# Patient Record
Sex: MALE | Race: OTHER | HISPANIC OR LATINO | URBAN - METROPOLITAN AREA
[De-identification: names, ages, dates, MRNs, and addresses within clinical notes are randomized per-mention and may not be internally consistent; named-entity substitution may affect disease eponyms.]

---

## 2018-02-08 ENCOUNTER — INPATIENT (INPATIENT)
Facility: HOSPITAL | Age: 48
LOS: 6 days | Discharge: HOME HEALTH | DRG: 236 | End: 2018-02-15
Attending: THORACIC SURGERY (CARDIOTHORACIC VASCULAR SURGERY) | Admitting: THORACIC SURGERY (CARDIOTHORACIC VASCULAR SURGERY)
Payer: COMMERCIAL

## 2018-02-08 VITALS
TEMPERATURE: 98 F | HEART RATE: 74 BPM | OXYGEN SATURATION: 95 % | HEIGHT: 68 IN | WEIGHT: 193.79 LBS | RESPIRATION RATE: 20 BRPM | DIASTOLIC BLOOD PRESSURE: 77 MMHG | SYSTOLIC BLOOD PRESSURE: 121 MMHG

## 2018-02-08 DIAGNOSIS — I10 ESSENTIAL (PRIMARY) HYPERTENSION: ICD-10-CM

## 2018-02-08 DIAGNOSIS — Z98.890 OTHER SPECIFIED POSTPROCEDURAL STATES: Chronic | ICD-10-CM

## 2018-02-08 DIAGNOSIS — Z29.9 ENCOUNTER FOR PROPHYLACTIC MEASURES, UNSPECIFIED: ICD-10-CM

## 2018-02-08 DIAGNOSIS — E78.5 HYPERLIPIDEMIA, UNSPECIFIED: ICD-10-CM

## 2018-02-08 DIAGNOSIS — S86.012A STRAIN OF LEFT ACHILLES TENDON, INITIAL ENCOUNTER: Chronic | ICD-10-CM

## 2018-02-08 DIAGNOSIS — I46.9 CARDIAC ARREST, CAUSE UNSPECIFIED: ICD-10-CM

## 2018-02-08 DIAGNOSIS — I25.10 ATHEROSCLEROTIC HEART DISEASE OF NATIVE CORONARY ARTERY WITHOUT ANGINA PECTORIS: ICD-10-CM

## 2018-02-08 DIAGNOSIS — R63.8 OTHER SYMPTOMS AND SIGNS CONCERNING FOOD AND FLUID INTAKE: ICD-10-CM

## 2018-02-08 PROBLEM — Z00.00 ENCOUNTER FOR PREVENTIVE HEALTH EXAMINATION: Status: ACTIVE | Noted: 2018-02-08

## 2018-02-08 LAB
ALBUMIN SERPL ELPH-MCNC: 3.7 G/DL — SIGNIFICANT CHANGE UP (ref 3.3–5)
ALP SERPL-CCNC: 77 U/L — SIGNIFICANT CHANGE UP (ref 40–120)
ALT FLD-CCNC: 86 U/L — HIGH (ref 10–45)
ANION GAP SERPL CALC-SCNC: 11 MMOL/L — SIGNIFICANT CHANGE UP (ref 5–17)
APPEARANCE UR: CLEAR — SIGNIFICANT CHANGE UP
APTT BLD: 30.6 SEC — SIGNIFICANT CHANGE UP (ref 27.5–37.4)
AST SERPL-CCNC: 42 U/L — HIGH (ref 10–40)
BASOPHILS NFR BLD AUTO: 0.2 % — SIGNIFICANT CHANGE UP (ref 0–2)
BILIRUB SERPL-MCNC: 0.3 MG/DL — SIGNIFICANT CHANGE UP (ref 0.2–1.2)
BILIRUB UR-MCNC: NEGATIVE — SIGNIFICANT CHANGE UP
BLD GP AB SCN SERPL QL: NEGATIVE — SIGNIFICANT CHANGE UP
BUN SERPL-MCNC: 15 MG/DL — SIGNIFICANT CHANGE UP (ref 7–23)
CALCIUM SERPL-MCNC: 8.8 MG/DL — SIGNIFICANT CHANGE UP (ref 8.4–10.5)
CHLORIDE SERPL-SCNC: 107 MMOL/L — SIGNIFICANT CHANGE UP (ref 96–108)
CHOLEST SERPL-MCNC: 140 MG/DL — SIGNIFICANT CHANGE UP (ref 10–199)
CK MB CFR SERPL CALC: 3.6 NG/ML — SIGNIFICANT CHANGE UP (ref 0–6.7)
CK SERPL-CCNC: 341 U/L — HIGH (ref 30–200)
CO2 SERPL-SCNC: 23 MMOL/L — SIGNIFICANT CHANGE UP (ref 22–31)
COLOR SPEC: YELLOW — SIGNIFICANT CHANGE UP
CREAT SERPL-MCNC: 0.92 MG/DL — SIGNIFICANT CHANGE UP (ref 0.5–1.3)
DIFF PNL FLD: (no result)
EOSINOPHIL NFR BLD AUTO: 1.1 % — SIGNIFICANT CHANGE UP (ref 0–6)
GLUCOSE SERPL-MCNC: 130 MG/DL — HIGH (ref 70–99)
GLUCOSE UR QL: NEGATIVE — SIGNIFICANT CHANGE UP
HBA1C BLD-MCNC: 5.9 % — HIGH (ref 4–5.6)
HCT VFR BLD CALC: 40.4 % — SIGNIFICANT CHANGE UP (ref 39–50)
HDLC SERPL-MCNC: 44 MG/DL — SIGNIFICANT CHANGE UP (ref 40–125)
HGB BLD-MCNC: 13.1 G/DL — SIGNIFICANT CHANGE UP (ref 13–17)
INR BLD: 1.07 — SIGNIFICANT CHANGE UP (ref 0.88–1.16)
KETONES UR-MCNC: NEGATIVE — SIGNIFICANT CHANGE UP
LEUKOCYTE ESTERASE UR-ACNC: NEGATIVE — SIGNIFICANT CHANGE UP
LIPID PNL WITH DIRECT LDL SERPL: 75 MG/DL — SIGNIFICANT CHANGE UP
LYMPHOCYTES # BLD AUTO: 26.5 % — SIGNIFICANT CHANGE UP (ref 13–44)
MAGNESIUM SERPL-MCNC: 2 MG/DL — SIGNIFICANT CHANGE UP (ref 1.6–2.6)
MCHC RBC-ENTMCNC: 28.6 PG — SIGNIFICANT CHANGE UP (ref 27–34)
MCHC RBC-ENTMCNC: 32.4 G/DL — SIGNIFICANT CHANGE UP (ref 32–36)
MCV RBC AUTO: 88.2 FL — SIGNIFICANT CHANGE UP (ref 80–100)
MONOCYTES NFR BLD AUTO: 9.7 % — SIGNIFICANT CHANGE UP (ref 2–14)
NEUTROPHILS NFR BLD AUTO: 62.5 % — SIGNIFICANT CHANGE UP (ref 43–77)
NITRITE UR-MCNC: NEGATIVE — SIGNIFICANT CHANGE UP
PCP SPEC-MCNC: SIGNIFICANT CHANGE UP
PH UR: 6 — SIGNIFICANT CHANGE UP (ref 5–8)
PLATELET # BLD AUTO: 245 K/UL — SIGNIFICANT CHANGE UP (ref 150–400)
POTASSIUM SERPL-MCNC: 3.8 MMOL/L — SIGNIFICANT CHANGE UP (ref 3.5–5.3)
POTASSIUM SERPL-SCNC: 3.8 MMOL/L — SIGNIFICANT CHANGE UP (ref 3.5–5.3)
PROT SERPL-MCNC: 6.4 G/DL — SIGNIFICANT CHANGE UP (ref 6–8.3)
PROT UR-MCNC: NEGATIVE MG/DL — SIGNIFICANT CHANGE UP
PROTHROM AB SERPL-ACNC: 11.9 SEC — SIGNIFICANT CHANGE UP (ref 9.8–12.7)
RBC # BLD: 4.58 M/UL — SIGNIFICANT CHANGE UP (ref 4.2–5.8)
RBC # FLD: 13.5 % — SIGNIFICANT CHANGE UP (ref 10.3–16.9)
RH IG SCN BLD-IMP: POSITIVE — SIGNIFICANT CHANGE UP
RH IG SCN BLD-IMP: POSITIVE — SIGNIFICANT CHANGE UP
SODIUM SERPL-SCNC: 141 MMOL/L — SIGNIFICANT CHANGE UP (ref 135–145)
SP GR SPEC: 1.02 — SIGNIFICANT CHANGE UP (ref 1–1.03)
TOTAL CHOLESTEROL/HDL RATIO MEASUREMENT: 3.2 RATIO — LOW (ref 3.4–9.6)
TRIGL SERPL-MCNC: 105 MG/DL — SIGNIFICANT CHANGE UP (ref 10–149)
TROPONIN T SERPL-MCNC: 0.02 NG/ML — HIGH (ref 0–0.01)
TSH SERPL-MCNC: 0.52 UIU/ML — SIGNIFICANT CHANGE UP (ref 0.35–4.94)
UROBILINOGEN FLD QL: 0.2 E.U./DL — SIGNIFICANT CHANGE UP
WBC # BLD: 8.9 K/UL — SIGNIFICANT CHANGE UP (ref 3.8–10.5)
WBC # FLD AUTO: 8.9 K/UL — SIGNIFICANT CHANGE UP (ref 3.8–10.5)

## 2018-02-08 PROCEDURE — 93880 EXTRACRANIAL BILAT STUDY: CPT | Mod: 26

## 2018-02-08 PROCEDURE — 71045 X-RAY EXAM CHEST 1 VIEW: CPT | Mod: 26

## 2018-02-08 RX ORDER — AMIODARONE HYDROCHLORIDE 400 MG/1
0.5 TABLET ORAL
Qty: 900 | Refills: 0 | Status: DISCONTINUED | OUTPATIENT
Start: 2018-02-08 | End: 2018-02-09

## 2018-02-08 RX ORDER — CHLORHEXIDINE GLUCONATE 213 G/1000ML
1 SOLUTION TOPICAL ONCE
Qty: 0 | Refills: 0 | Status: COMPLETED | OUTPATIENT
Start: 2018-02-08 | End: 2018-02-08

## 2018-02-08 RX ORDER — CHLORHEXIDINE GLUCONATE 213 G/1000ML
10 SOLUTION TOPICAL ONCE
Qty: 0 | Refills: 0 | Status: DISCONTINUED | OUTPATIENT
Start: 2018-02-08 | End: 2018-02-12

## 2018-02-08 RX ORDER — CHLORHEXIDINE GLUCONATE 213 G/1000ML
1 SOLUTION TOPICAL ONCE
Qty: 0 | Refills: 0 | Status: COMPLETED | OUTPATIENT
Start: 2018-02-09 | End: 2018-02-09

## 2018-02-08 RX ORDER — POTASSIUM CHLORIDE 20 MEQ
20 PACKET (EA) ORAL ONCE
Qty: 0 | Refills: 0 | Status: COMPLETED | OUTPATIENT
Start: 2018-02-08 | End: 2018-02-08

## 2018-02-08 RX ADMIN — Medication 20 MILLIEQUIVALENT(S): at 22:24

## 2018-02-08 RX ADMIN — AMIODARONE HYDROCHLORIDE 16.67 MG/MIN: 400 TABLET ORAL at 22:23

## 2018-02-08 NOTE — H&P ADULT - NSHPSOCIALHISTORY_GEN_ALL_CORE
Never smoker. Social drinker. Works in [employer] and travels often for his job. Lives with his fiance

## 2018-02-08 NOTE — CONSULT NOTE ADULT - ASSESSMENT
47y Male PMHx HTN, HLD, CAD (refused OHS 5 years ago after cath revealed 3VCAD), BIBA to OSH after cardiopulmonary arrest requiring CPR after a soccer game. Patient was defibrillated twice, and ROSC acheived.  At OSH patient started on amio, admitted to CCU, started on ASA/Plavix/statin.  On 2/8/18 patient undwerwent LHC revealing 3VCAD. CT surgery Dr. Nichole consulted for surgical intervention, plan for CABG tomorrow.    3VCAD s/p CPA requiring CPR  -Please obtain CBC, BMP, Coags, T&Sx2, U/A, BNP, Cardiac enzymes, TSH, A1c, Echo, PFTs, CXR, EKG, CT head non con  -Potential first case for tomorrow  -patient refusing to sign consent, Dr. Nichole will see and discuss with patient. 47y Male PMHx HTN, HLD, CAD (refused OHS 5 years ago after cath revealed 3VCAD), BIBA to OSH after cardiopulmonary arrest requiring CPR after a soccer game. Patient was defibrillated twice, and ROSC acheived.  At OSH patient started on amio, admitted to CCU, started on ASA/Plavix/statin.  On 2/8/18 patient undwerwent LHC revealing 3VCAD. CT surgery Dr. Nichole consulted for surgical intervention, plan for CABG tomorrow.    3VCAD s/p CPA requiring CPR  -Please obtain CBC, BMP, Coags, T&Sx2, U/A, BNP, Cardiac enzymes, TSH, A1c, Echo, PFTs, CXR, EKG, CT head non con  -Pt will go second case tomorrow if he consents.  Please keep him NPO after midnight in anticipation of OR, will see him in AM tomorrow to further discuss.   -patient refusing to sign consent, Dr. Nichole will see and discuss with patient.

## 2018-02-08 NOTE — H&P ADULT - ASSESSMENT
46 yo M with PMHx HTN,HLD, CAD; FHx of cousin with sudden death from MI in his 40s, transferred to [facility] from [facility] after he initially presented s/p cardiopulmonary arrest while playing soccer. Left heart catheterization 2/8/18 found multiple vessel CAD. He was transferred to [facility] for CABG, admitted to CCU for monitoring overnight prior to surgery.

## 2018-02-08 NOTE — H&P ADULT - PROBLEM SELECTOR PLAN 2
left heart catheterization 2/8/18 found 80% stenosis of RCA, 70% osteal stenosis of PDA with sequential lesions of 80-85% stenosis; patent LM; 50% osteal stenosis circumflex, 75% stenosis of mid circumflex, 80% stenosis mid to distal circumflex; 95-99% stenosis dOM1 with; 85% stenosis of pLAD and 70% stenosis dLAD.  LV gram 60% with no wall motion abnormality.  -Plan for CABG

## 2018-02-08 NOTE — H&P ADULT - NSHPPHYSICALEXAM_GEN_ALL_CORE
VITAL SIGNS:  T(F): 98 (02-08-18 @ 18:03), Max: 98 (02-08-18 @ 18:00)  HR: 74 (02-08-18 @ 18:00) (74 - 74)  BP: 121/77 (02-08-18 @ 18:00) (121/77 - 121/77)  BP(mean): 92 (02-08-18 @ 18:00) (92 - 92)  RR: 20 (02-08-18 @ 18:00) (20 - 20)  SpO2: 95% (02-08-18 @ 18:00) (95% - 95%)    PHYSICAL EXAM:  Constitutional: WDWN, NAD, resting comfortably   Head: NC/AT  Eyes: PERRL, EOMI, anicteric sclera, no mucosal pallor  ENT: no nasal discharge; uvula midline, no oropharyngeal erythema or exudates; MMM  Neck: supple; no JVD or thyromegaly, no lymphadenopathy  Respiratory: CTA B/L; no W/R/R, no retractions  Cardiac: +S1/S2; RRR; no M/R/G  Gastrointestinal: abdomen soft, NT/ND; no rebound or guarding; +BSx4  Back: spine midline, no bony tenderness or step-offs; no CVAT B/L  Extremities: warm; no calf tenderness, no pedal edema, no cyanosis, no clubbing  Musculoskeletal: NROM x4; no joint swelling, tenderness or erythema  Vascular: 2+ radial; DP/PT pulses B/L  Dermatologic: no rash, no petechia   Lymphatic: no submandibular or cervical LAD  Neurologic: AAOx3; CNII-XII grossly intact; 5/5 strength throughout, sensory symmetrically intact in B/L LE   Psychiatric: pleasant and conversive; affect and characteristics of appearance, verbalizations, behaviors are appropriate

## 2018-02-08 NOTE — H&P ADULT - FAMILY HISTORY
Family history of heart attack     Father  Still living? Unknown  Family history of hypertension, Age at diagnosis: Age Unknown     Mother  Still living? Unknown  Family history of hypertension, Age at diagnosis: Age Unknown  Family history of diabetes mellitus, Age at diagnosis: Age Unknown

## 2018-02-08 NOTE — H&P ADULT - PROBLEM SELECTOR PLAN 1
Pt with cardiac arrest lasting 2-4 min. S/p CPR and defibrillation with ROSC. S/p amiodarone 450 mg 16 cc/hr (0.5 mg /min x 18hrs) started at 9:28 AM; ASA 81 mg /Plavix/Atorvastatin 80mg.  -Likely 2/2 multivessel CAD  -EKG 2/8 from [facility] with sinus rhythm with 1st degree heart block, Q wave in III, aVF.  -Episodes of multiple ventricular ectopy beats and non sustained VT during cardiac cath

## 2018-02-08 NOTE — H&P ADULT - HISTORY OF PRESENT ILLNESS
46 yo M with PMHx HTN,HLD, CAD; FHx of sudden death from MI in 40s, transfered to [facility] from [facility] after he initiially presented s/p cardiopulmonary arrest while playing soccer.  Five years ago the patient had [facility] and was found to have triple vessel disease and refused CT surgery at that time.  He was playing soccer when he lost consciousness for 2-4 minutes. CPR was started on the field, pt was defibrillated twice and ROSC was achieved. He was brought by EMS to the hospital. He was started on amiodarone, admitted to the CCU, started on ASA/Plavix/atorvastatin.   Upon arrival to [facility] on 2/8/18, vitals were stable (T97.5, HR 78, RR 20, SpO2 98%, /77). Labs were unremarkable for FSBG 137, Trop I 0.27, CKMB 3.7, BNP 18, PT/INR/PTT 12.6/1.1/29, BUN/Crt 14/1.03, mild transaminitis AST/ALT 72/113, Alk Phos 79, H/H 14.6/45. Chest X-ray with clear lungs. EKG showed sinus rhythm with 1st degree heart block, Q wave in III, aVF.  The patient underwent left heart catheterization 2/8/18 found 8% stenosis of RCA, PDA with 70% osteal stenosis with sequential lesions of 80-85% stenosis; LM is patent; 50% osteal stenosis on circumflex, 75% stenosis of mid circumflex, mid to distal circumflex 80% stenosis; dOM1 with 95-99% stenosis; 85% stenosis in the proximal LAD and 70% stenosis in the distal LAD.  This is unchanged from his cardiac catheterization 5 years ago.  LV gram shows preserved ejection fraction. The plan was discussed as multiple stents vs CABG and he declined the offer for intervention. He was transferred to [facility] for CABG, admitted to CCU for monitoring overnight prior to surgery. Upon arrival, pt was hemodynamically stable. He denied any SOB/CP/palpitations/diarrhea/consitation/N/V, fevers/chills. He has had no peripheral edema, orthopnea, PND, no exercise intolerance. 46 yo M with PMHx HTN,HLD, CAD; FHx of cousin with sudden death from MI in his 40s, transferred to [facility] from [facility] after he initially presented s/p cardiopulmonary arrest while playing soccer.  Five years ago the patient had [facility] and was found to have triple vessel disease and refused CT surgery at that time.  He was playing soccer when he lost consciousness for 2-4 minutes. CPR was started on the field, pt was defibrillated twice and ROSC was achieved. He was brought by EMS to the hospital. He was started on amiodarone, admitted to the CCU, started on ASA/Plavix/atorvastatin.   Upon arrival to [facility] on 2/8/18, vitals were stable (T97.5, HR 78, RR 20, SpO2 98%, /77). Labs were unremarkable for FSBG 137, Trop I 0.27, CKMB 3.7, BNP 18, PT/INR/PTT 12.6/1.1/29, BUN/Crt 14/1.03, mild transaminitis AST/ALT 72/113, Alk Phos 79, H/H 14.6/45. Chest X-ray with clear lungs. EKG showed sinus rhythm with 1st degree heart block, Q wave in III, aVF.  The patient underwent left heart catheterization 2/8/18 found 80% stenosis of RCA, 70% osteal stenosis of PDA with sequential lesions of 80-85% stenosis; patent LM; 50% osteal stenosis circumflex, 75% stenosis of mid circumflex, 80% stenosis mid to distal circumflex; 95-99% stenosis dOM1 with; 85% stenosis of pLAD and 70% stenosis dLAD.  LV gram 60% with no wall motion abnormality. The plan was discussed as multiple stents vs CABG and he declined the offer for intervention. He was transferred to [facility] for CABG, admitted to CCU for monitoring overnight prior to surgery. Upon arrival, pt was hemodynamically stable. He denied any SOB/CP/palpitations/diarrhea/constipation/N/V, fevers/chills. He has had no peripheral edema, orthopnea, PND, no exercise intolerance.

## 2018-02-08 NOTE — CONSULT NOTE ADULT - SUBJECTIVE AND OBJECTIVE BOX
Surgeon: Dr. Nichole    Requesting Physician: Dr. Tamara Valentin    HISTORY OF PRESENT ILLNESS:  47y Male PMHx HTN, HLD, CAD (refused OHS 5 years ago after cath revealed 3VCAD), BIBA to OSH after cardiopulmonary arrest requiring CPR after a soccer game. Patient was defibrillated twice, and ROSC acheived.  At OSH patient started on amio, admitted to CCU, started on ASA/Plavix/statin.  On 18 patient undwerwent LHC revealing 3VCAD (RCA 80%, osPDA 70% with sequential lesions of 80-85%, LCx 50%, mCx 75%, m-dCx 80% dOM1 95-99%, pLAD 85%, dLAD 70% EF 60%). Patient was then transferred to [facility] for CABG evaluation with Dr. Nichole, and was admitted to CCU.  Patient is currently refusing intervention at this time.     PAST MEDICAL & SURGICAL HISTORY:  HLD (hyperlipidemia)  HTN (hypertension)  CAD (coronary artery disease)  H/O umbilical hernia repair  Achilles rupture, left: s/p repair  History of appendectomy      MEDICATIONS  (STANDING):  amiodarone Infusion 0.5 mG/Min (16.667 mL/Hr) IV Continuous <Continuous>  chlorhexidine 0.12% Liquid 10 milliLiter(s) Swish and Spit once  chlorhexidine 4% Liquid 1 Application(s) Topical once  chlorhexidine 4% Liquid 1 Application(s) Topical once    MEDICATIONS  (PRN):      Allergies    No Known Allergies    Intolerances        SOCIAL HISTORY:  Smoker:  No  ETOH use:  No  Ilicit Drug use:  NO  Assisted device use (Cane / Walker): None  Live with: spouse    FAMILY HISTORY:  Family history of heart attack  Family history of diabetes mellitus (Mother)  Family history of hypertension (Father, Mother)      Review of Systems  CONSTITUTIONAL:  Fevers / chills, sweats, fatigue, weight loss, weight gain                                    NEGATIVE  NEURO:  parathesias, seizures, syncope, confusion                                                                               NEGATIVE  EYES:  Blurry vision, discharge, pain, loss of vision                                                                                  NEGATIVE  ENMT:  Difficulty hearing, vertigo, dysphagia, epistaxis, recent dental work                                     NEGATIVE  CV:  Chest pain, palpitations, EDWARDS, orthopnea                                                                                         NEGATIVE  RESPIRATORY:  Wheezing, SOB, cough / sputum, hemoptysis                                                              NEGATIVE  GI:  Nausea, vommiting, diarrhea, constipation, melena                                                                        NEGATIVE  : Hematuria, dysuria, urgency, incontinence                                                                                       NEGATIVE  MUSKULOSKELETAL:  arthritis, joint swelling, muscle weakness                                                           NEGATIVE  SKIN/BREAST:  rash, itching, renata loss, masses                                                                                            NEGATIVE  PSYCH:  depresion, anxiety, suicidal ideation                                                                                             NEGATIVE  HEME/LYMPH:  bruises easily, enlarged lymph nodes, tender lymph nodes                                        NEGATIVE  ENDOCRINE:  cold intolerance, heat intolerance, polydipsia                                                                   NEGATIVE    PHYSICAL EXAM  Vital Signs Last 24 Hrs  T(C): 36.7 (2018 18:03), Max: 36.7 (2018 18:00)  T(F): 98 (2018 18:03), Max: 98 (2018 18:00)  HR: 66 (2018 19:00) (66 - 74)  BP: 121/77 (2018 19:00) (121/77 - 121/77)  BP(mean): 92 (2018 19:00) (92 - 92)  RR: 17 (2018 19:00) (17 - 20)  SpO2: 97% (2018 19:00) (95% - 97%)    CONSTITUTIONAL:                                                                          WNL  NEURO:                                                                                             WNL                      EYES:                                                                                                  WNL  ENMT:                                                                                                WNL  CV:                                                                                                      WNL  RESPIRATORY:                                                                                  WNL  GI:                                                                                                       WNL  : TSE + / -                                                                                 WNL  MUSKULOSKELETAL:                                                                       WNL  SKIN / BREAST:                                                                                 WNL                                                          LABS:                        13.1   8.9   )-----------( 245      ( 2018 18:50 )             40.4     02-08    141  |  107  |  15  ----------------------------<  130<H>  3.8   |  23  |  0.92    Ca    8.8      2018 18:49  Mg     2.0     02-08    TPro  6.4  /  Alb  3.7  /  TBili  0.3  /  DBili  x   /  AST  42<H>  /  ALT  86<H>  /  AlkPhos  77  02-08    PT/INR - ( 2018 18:50 )   PT: 11.9 sec;   INR: 1.07          PTT - ( 2018 18:50 )  PTT:30.6 sec  Urinalysis Basic - ( 2018 19:10 )    Color: Yellow / Appearance: Clear / S.020 / pH: x  Gluc: x / Ketone: NEGATIVE  / Bili: Negative / Urobili: 0.2 E.U./dL   Blood: x / Protein: NEGATIVE mg/dL / Nitrite: NEGATIVE   Leuk Esterase: NEGATIVE / RBC: < 5 /HPF / WBC < 5 /HPF   Sq Epi: x / Non Sq Epi: 5-10 /HPF / Bacteria: Present /HPF      CARDIAC MARKERS ( 2018 18:49 )  x     / 0.02 ng/mL / 341 U/L / x     / 3.6 ng/mL          RADIOLOGY & ADDITIONAL STUDIES:  CAROTID U/S:    CXR:    CT Scan:    EKG:    TTE / DARRELL:    Cardiac Cath: Surgeon: Dr. Nichole    Requesting Physician: Dr. Tamara Valentin    HISTORY OF PRESENT ILLNESS:  47y Male PMHx HTN, HLD, CAD (refused OHS 5 years ago after cath revealed 3VCAD), BIBA to OSH after Vfib/cardiac arrest requiring CPR after a soccer game. Patient was defibrillated twice, and ROSC acheived.  At OSH patient started on amio, admitted to CCU, started on ASA/Plavix/statin.  On 18 patient undwerwent LHC revealing 3VCAD (RCA 80%, osPDA 70% with sequential lesions of 80-85%, LCx 50%, mCx 75%, m-dCx 80% dOM1 95-99%, pLAD 85%, dLAD 70% EF 60%). Patient was then transferred to [facility] for CABG evaluation with Dr. Nichole, and was admitted to CCU.  On admission, pt states that he feels well and is reluctant to undergo surgical intervention at this time as he has been medically managed for his CAD for 5 years without acute incident prior to this episode.  He would like to discuss this further with his cardiologist, Dr. Lowe and Dr. Nichole.  Denies HA, AMS, CP, palpitations, SOB, cough, hemoptysis, n/v/d, fever.     PAST MEDICAL & SURGICAL HISTORY:  HLD (hyperlipidemia)  HTN (hypertension)  CAD (coronary artery disease)  H/O umbilical hernia repair  Achilles rupture, left: s/p repair  History of appendectomy      MEDICATIONS  (STANDING):  amiodarone Infusion 0.5 mG/Min (16.667 mL/Hr) IV Continuous <Continuous>  chlorhexidine 0.12% Liquid 10 milliLiter(s) Swish and Spit once  chlorhexidine 4% Liquid 1 Application(s) Topical once  chlorhexidine 4% Liquid 1 Application(s) Topical once    MEDICATIONS  (PRN):      Allergies    No Known Allergies    Intolerances      SOCIAL HISTORY:  Smoker:  No  ETOH use:  No  Ilicit Drug use:  NO  Assisted device use (Cane / Walker): None  Live with: spouse    FAMILY HISTORY:  Family history of heart attack  Family history of diabetes mellitus (Mother)  Family history of hypertension (Father, Mother)    Review of Systems  CONSTITUTIONAL:  Denies Fevers / chills, sweats, fatigue, weight loss, weight gain                                      NEURO:  Denies paresthesia, seizures, syncope, confusion                                                                                EYES:  Denies Blurry vision, discharge, pain, loss of vision                                                                                    ENMT:  Denies Difficulty hearing, vertigo, dysphagia, epistaxis, recent dental work                                       CV:  Denies Chest pain, palpitations, EDWARDS, orthopnea                                                                                          RESPIRATORY:  Denies Wheezing, SOB, cough / sputum, hemoptysis                                                                GI:  Denies Nausea, vomiting, diarrhea, constipation, melena, difficulty swallowing                                               : Denies Hematuria, dysuria, urgency, incontinence                                                                                         MUSCULOSKELETAL:  Denies arthritis, joint swelling, muscle weakness                                                             SKIN/BREAST:  Denies rash, itching, hair loss, masses                                                                                            PSYCH:  Denies depresion, anxiety, suicidal ideation                                                                                               HEME/LYMPH:  Denies bruises easily, enlarged lymph nodes, tender lymph nodes                                        ENDOCRINE:  Denies cold intolerance, heat intolerance, polydipsia                                                                    PHYSICAL EXAM  Vital Signs Last 24 Hrs  T(C): 36.7 (2018 18:03), Max: 36.7 (2018 18:00)  T(F): 98 (2018 18:03), Max: 98 (2018 18:00)  HR: 66 (2018 19:00) (66 - 74)  BP: 121/77 (2018 19:00) (121/77 - 121/77)  BP(mean): 92 (2018 19:00) (92 - 92)  RR: 17 (2018 19:00) (17 - 20)  SpO2: 97% (2018 19:00) (95% - 97%)    CONSTITUTIONAL: Resting in bed, no acute distress.   NEURO: AAOx3, no AMS or focal deficits.                     EYES:  WNL  ENMT: WNL  CV:  RRR, S1/S2, no m/r/g.  RESPIRATORY: No acute issues on RA.  CTA b/l, no w/r/r.   GI: ND, NBS, non-TTP.  :  WNL  MUSKULOSKELETAL:  WNL  SKIN / BREAST:   WNL                                                          LABS:                        13.1   8.9   )-----------( 245      ( 2018 18:50 )             40.4     02-08    141  |  107  |  15  ----------------------------<  130<H>  3.8   |  23  |  0.92    Ca    8.8      2018 18:49  Mg     2.0     02-08    TPro  6.4  /  Alb  3.7  /  TBili  0.3  /  DBili  x   /  AST  42<H>  /  ALT  86<H>  /  AlkPhos  77  02-08    PT/INR - ( 2018 18:50 )   PT: 11.9 sec;   INR: 1.07          PTT - ( 2018 18:50 )  PTT:30.6 sec  Urinalysis Basic - ( 2018 19:10 )    Color: Yellow / Appearance: Clear / S.020 / pH: x  Gluc: x / Ketone: NEGATIVE  / Bili: Negative / Urobili: 0.2 E.U./dL   Blood: x / Protein: NEGATIVE mg/dL / Nitrite: NEGATIVE   Leuk Esterase: NEGATIVE / RBC: < 5 /HPF / WBC < 5 /HPF   Sq Epi: x / Non Sq Epi: 5-10 /HPF / Bacteria: Present /HPF      CARDIAC MARKERS ( 2018 18:49 )  x     / 0.02 ng/mL / 341 U/L / x     / 3.6 ng/mL    RADIOLOGY & ADDITIONAL STUDIES:  CAROTID U/S: pending    CXR:  < from: Xray Chest 1 View-PORTABLE IMMEDIATE (18 @ 19:02) >    INTERPRETATION:  TIME OF STUDY: 2018 7:02 PM    CLINICAL INFORMATION:  CAD    PRIORS: None.    FINDINGS: No focal consolidation. No pneumothorax or pleural effusions.   The cardiac and mediastinal silhouettes are unremarkable. No acute   osseous abnormality is seen.      IMPRESSION: Clear lungs.    < end of copied text >      CT Scan: head CT pending    EKG: pending    TTE / DARRELL: pending    Cardiac Cath: results above

## 2018-02-09 ENCOUNTER — APPOINTMENT (OUTPATIENT)
Dept: CARDIOTHORACIC SURGERY | Facility: HOSPITAL | Age: 48
End: 2018-02-09

## 2018-02-09 DIAGNOSIS — R73.03 PREDIABETES: ICD-10-CM

## 2018-02-09 LAB
ANION GAP SERPL CALC-SCNC: 13 MMOL/L — SIGNIFICANT CHANGE UP (ref 5–17)
BUN SERPL-MCNC: 13 MG/DL — SIGNIFICANT CHANGE UP (ref 7–23)
CALCIUM SERPL-MCNC: 8.7 MG/DL — SIGNIFICANT CHANGE UP (ref 8.4–10.5)
CHLORIDE SERPL-SCNC: 104 MMOL/L — SIGNIFICANT CHANGE UP (ref 96–108)
CO2 SERPL-SCNC: 22 MMOL/L — SIGNIFICANT CHANGE UP (ref 22–31)
CREAT SERPL-MCNC: 0.87 MG/DL — SIGNIFICANT CHANGE UP (ref 0.5–1.3)
GLUCOSE SERPL-MCNC: 115 MG/DL — HIGH (ref 70–99)
HCT VFR BLD CALC: 38.6 % — LOW (ref 39–50)
HGB BLD-MCNC: 12.9 G/DL — LOW (ref 13–17)
MAGNESIUM SERPL-MCNC: 2 MG/DL — SIGNIFICANT CHANGE UP (ref 1.6–2.6)
MCHC RBC-ENTMCNC: 29.6 PG — SIGNIFICANT CHANGE UP (ref 27–34)
MCHC RBC-ENTMCNC: 33.4 G/DL — SIGNIFICANT CHANGE UP (ref 32–36)
MCV RBC AUTO: 88.5 FL — SIGNIFICANT CHANGE UP (ref 80–100)
PLATELET # BLD AUTO: 202 K/UL — SIGNIFICANT CHANGE UP (ref 150–400)
POTASSIUM SERPL-MCNC: 3.9 MMOL/L — SIGNIFICANT CHANGE UP (ref 3.5–5.3)
POTASSIUM SERPL-SCNC: 3.9 MMOL/L — SIGNIFICANT CHANGE UP (ref 3.5–5.3)
RBC # BLD: 4.36 M/UL — SIGNIFICANT CHANGE UP (ref 4.2–5.8)
RBC # FLD: 13.6 % — SIGNIFICANT CHANGE UP (ref 10.3–16.9)
SODIUM SERPL-SCNC: 139 MMOL/L — SIGNIFICANT CHANGE UP (ref 135–145)
WBC # BLD: 6.6 K/UL — SIGNIFICANT CHANGE UP (ref 3.8–10.5)
WBC # FLD AUTO: 6.6 K/UL — SIGNIFICANT CHANGE UP (ref 3.8–10.5)

## 2018-02-09 PROCEDURE — 93306 TTE W/DOPPLER COMPLETE: CPT | Mod: 26

## 2018-02-09 PROCEDURE — 99254 IP/OBS CNSLTJ NEW/EST MOD 60: CPT

## 2018-02-09 PROCEDURE — 94010 BREATHING CAPACITY TEST: CPT | Mod: 26

## 2018-02-09 PROCEDURE — 93010 ELECTROCARDIOGRAM REPORT: CPT

## 2018-02-09 PROCEDURE — 99291 CRITICAL CARE FIRST HOUR: CPT

## 2018-02-09 RX ORDER — ISOSORBIDE MONONITRATE 60 MG/1
30 TABLET, EXTENDED RELEASE ORAL DAILY
Qty: 0 | Refills: 0 | Status: DISCONTINUED | OUTPATIENT
Start: 2018-02-09 | End: 2018-02-12

## 2018-02-09 RX ORDER — AMIODARONE HYDROCHLORIDE 400 MG/1
400 TABLET ORAL THREE TIMES A DAY
Qty: 0 | Refills: 0 | Status: DISCONTINUED | OUTPATIENT
Start: 2018-02-09 | End: 2018-02-12

## 2018-02-09 RX ORDER — ATORVASTATIN CALCIUM 80 MG/1
80 TABLET, FILM COATED ORAL AT BEDTIME
Qty: 0 | Refills: 0 | Status: DISCONTINUED | OUTPATIENT
Start: 2018-02-09 | End: 2018-02-12

## 2018-02-09 RX ORDER — MAGNESIUM SULFATE 500 MG/ML
1 VIAL (ML) INJECTION ONCE
Qty: 0 | Refills: 0 | Status: COMPLETED | OUTPATIENT
Start: 2018-02-09 | End: 2018-02-09

## 2018-02-09 RX ORDER — ASPIRIN/CALCIUM CARB/MAGNESIUM 324 MG
81 TABLET ORAL DAILY
Qty: 0 | Refills: 0 | Status: DISCONTINUED | OUTPATIENT
Start: 2018-02-09 | End: 2018-02-12

## 2018-02-09 RX ORDER — AMIODARONE HYDROCHLORIDE 400 MG/1
400 TABLET ORAL
Qty: 0 | Refills: 0 | Status: DISCONTINUED | OUTPATIENT
Start: 2018-02-09 | End: 2018-02-09

## 2018-02-09 RX ORDER — POTASSIUM CHLORIDE 20 MEQ
40 PACKET (EA) ORAL ONCE
Qty: 0 | Refills: 0 | Status: COMPLETED | OUTPATIENT
Start: 2018-02-09 | End: 2018-02-09

## 2018-02-09 RX ORDER — METOPROLOL TARTRATE 50 MG
50 TABLET ORAL
Qty: 0 | Refills: 0 | Status: DISCONTINUED | OUTPATIENT
Start: 2018-02-09 | End: 2018-02-10

## 2018-02-09 RX ORDER — METOPROLOL TARTRATE 50 MG
50 TABLET ORAL
Qty: 0 | Refills: 0 | Status: DISCONTINUED | OUTPATIENT
Start: 2018-02-09 | End: 2018-02-09

## 2018-02-09 RX ORDER — POTASSIUM CHLORIDE 20 MEQ
20 PACKET (EA) ORAL ONCE
Qty: 0 | Refills: 0 | Status: COMPLETED | OUTPATIENT
Start: 2018-02-09 | End: 2018-02-09

## 2018-02-09 RX ADMIN — Medication 40 MILLIEQUIVALENT(S): at 11:11

## 2018-02-09 RX ADMIN — AMIODARONE HYDROCHLORIDE 400 MILLIGRAM(S): 400 TABLET ORAL at 13:12

## 2018-02-09 RX ADMIN — AMIODARONE HYDROCHLORIDE 400 MILLIGRAM(S): 400 TABLET ORAL at 22:26

## 2018-02-09 RX ADMIN — ATORVASTATIN CALCIUM 80 MILLIGRAM(S): 80 TABLET, FILM COATED ORAL at 22:26

## 2018-02-09 RX ADMIN — Medication 20 MILLIEQUIVALENT(S): at 06:57

## 2018-02-09 RX ADMIN — ISOSORBIDE MONONITRATE 30 MILLIGRAM(S): 60 TABLET, EXTENDED RELEASE ORAL at 11:11

## 2018-02-09 RX ADMIN — Medication 81 MILLIGRAM(S): at 11:11

## 2018-02-09 RX ADMIN — Medication 50 MILLIGRAM(S): at 18:38

## 2018-02-09 RX ADMIN — Medication 100 GRAM(S): at 11:11

## 2018-02-09 RX ADMIN — AMIODARONE HYDROCHLORIDE 400 MILLIGRAM(S): 400 TABLET ORAL at 06:57

## 2018-02-09 NOTE — CONSULT NOTE ADULT - SUBJECTIVE AND OBJECTIVE BOX
HPI:  46 yo male with PMHx of HTN, HLD and CAD s/p left heart cath yesterday 2/8/18 revealing RCA 80% stenosis, osteal PDA 70%, osteal circumflex 75%, 75% stenosis of mid circumflex, 80% stenosis mid to distal circumflex, 95-99% stenosis dOM1, 85% stenosis of pLAD and 70% stenosis dLAD.  Transferred to the CCU yesterday from [facility] in [place] where he presented s/p cardiopulmonary arrest after playing a soccer match (CPR done on the field, defib x 2, ROSC achieved as per report).    For surgical intervention (CABG) with Dr. Blum on Monday 2/12/18.    CODE STATUS: full code  	  MEDICATIONS  (STANDING):  amiodarone    Tablet 400 milliGRAM(s) Oral three times a day  aspirin enteric coated 81 milliGRAM(s) Oral daily  atorvastatin 80 milliGRAM(s) Oral at bedtime  chlorhexidine 0.12% Liquid 10 milliLiter(s) Swish and Spit once  isosorbide   mononitrate ER Tablet (IMDUR) 30 milliGRAM(s) Oral daily  metoprolol     tartrate 50 milliGRAM(s) Oral two times a day    ALLERGIES: NKDA    PAST MEDICAL & SURGICAL HISTORY:  HLD (hyperlipidemia)  HTN (hypertension)  CAD (coronary artery disease)  H/O umbilical hernia repair  Achilles rupture, left: s/p repair  History of appendectomy    FAMILY HISTORY:  Family history of heart attack (cousin)  Family history of diabetes mellitus (Mother)  Family history of hypertension (Father, Mother)  Family history of breast cancer (mother)  Family history of CAD (mother)    SOCIAL HISTORY:  social drinker: 1 alcoholic beverage per week  former smoker: 1 cigarette per week; quit > 15 years ago  denies use of illicit drugs  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: last travel January 2018 ([place] and [place])  lives with his luis     REVIEW OF SYSTEMS:   CONSTITUTIONAL: no fatigue, no fever, no chills, no recent weight gain or loss  EYES: no eye pain, no visual disturbances   ENMT: no earaches, no nosebleeds, no sore throat    NECK: no neck pain  RESPIRATORY: no SOB at rest, no EDWARDS  CARDIOVASCULAR: no chest pain, no syncope, no palpitations, no bl lower extremity edema  GASTROINTESTINAL: no abdominal pain, no constipation, no diarrhea, no nausea, no vomiting  GENITOURINARY: no dysuria, no incontinence  NEUROLOGICAL: no headaches, no dizziness, no numbness, no tingling  SKIN: no itching   ENDOCRINE: no excessive thirst, no intolerance to heat or cold  MUSCULOSKELETAL: no back pain, no joint stiffness  PSYCHIATRIC: no suicidal and no homicidal ideations    PHYSICAL EXAM:  Vital Signs Last 24 Hrs  T(C): 36.9 (09 Feb 2018 12:08), Max: 37.5 (09 Feb 2018 07:15)  T(F): 98.5 (09 Feb 2018 12:08), Max: 99.5 (09 Feb 2018 07:15)  HR: 68 (09 Feb 2018 11:00) (56 - 74)  BP: 148/93 (09 Feb 2018 11:00) (101/59 - 148/93)  BP(mean): 113 (09 Feb 2018 11:00) (73 - 113)  RR: 26 (09 Feb 2018 11:00) (12 - 26)  SpO2: 96% (09 Feb 2018 11:00) (95% - 99%)    Appearance: well nourished, well developed, resting in bed visibly comfortable in no acute distress	  HEENT:   NC/AT, PERRL, no thrush, no oral lesions, no exudate, pink and moist mucous membranes. 	  Lymphatic: no lymphadenopathy  Cardiovascular: +S1, +S2, no S3, no S4, no murmurs.  No JVD at close to 45 degrees.  No bilateral lower extremity edema  Respiratory: Lungs CTA bilateral	  Psychiatry: mood and affect appropriate  Gastrointestinal: soft, non-tender and non-distended with BS x 4q 	  Skin: warm and dry to touch, good skin turgor  Neurologic: AA&O x 4, no neuro focal deficits  Extremities: moving all extremities with equal bilateral strength with and without resistance  Vascular: +2 equal bilateral peripheral pulses    TELEMETRY: NSR @ 70bpm on monitor    02-09    139  |  104  |  13  ----------------------------<  115<H>  3.9   |  22  |  0.87    Ca    8.7      09 Feb 2018 05:03  Mg     2.0     02-09    TPro  6.4  /  Alb  3.7  /  TBili  0.3  /  DBili  x   /  AST  42<H>  /  ALT  86<H>  /  AlkPhos  77  02-08                          12.9   6.6   )-----------( 202      ( 09 Feb 2018 05:02 )             38.6     Troponin T, Serum (02.08.18 @ 18:49)    Troponin T, Serum: 0.02: Reference interval for troponin T is </= 0.01 ng/mL which includes the  99th percentile of a healthy population. Troponin T results are not  interchangeable with troponin I results. ng/mL    Troponin T, Serum (02.09.18 @ 05:03)    Troponin T, Serum: <0.01: Reference interval for troponin T is </= 0.01 ng/mL which includes the  99th percentile of a healthy population. Troponin T results are not  interchangeable with troponin I results. ng/mL    PT/INR - ( 08 Feb 2018 18:50 )   PT: 11.9 sec;   INR: 1.07       PTT - ( 08 Feb 2018 18:50 )  PTT:30.6 sec    Benzodiazepine, Urine (02.08.18 @ 19:10)    Benzodiazepine, Urine: Positive    < from: Xray Chest 1 View-PORTABLE IMMEDIATE (02.08.18 @ 19:02) >  XAM:  XR CHEST PORTABLE IMMED 1V                          PROCEDURE DATE:  02/08/2018      INTERPRETATION:  TIME OF STUDY: 2/8/2018 7:02 PM    CLINICAL INFORMATION:  CAD    PRIORS: None.    FINDINGS: No focal consolidation. No pneumothorax or pleural effusions.   The cardiac and mediastinal silhouettes are unremarkable. No acute   osseous abnormality is seen.      IMPRESSION: Clear lungs.    By: FARHAN BLUM M.D., RADIOLOGY RESIDENT        JOSE ANSARI M.D., ATTENDING RADIOLOGIST

## 2018-02-09 NOTE — PROGRESS NOTE ADULT - ASSESSMENT
46 yo M with PMHx HTN,HLD, CAD; FHx of cousin with sudden death from MI in his 40s, transferred to [facility] from [facility] after he initially presented s/p cardiopulmonary arrest while playing soccer. Left heart catheterization 2/8/18 found multiple vessel CAD. He was transferred to [facility] for CABG, admitted to CCU for monitoring overnight prior to surgery.

## 2018-02-09 NOTE — PROGRESS NOTE ADULT - ASSESSMENT
47y Male witg PMH significant for HTN and HLD presented to CT surgery with findings of CAD requiring for surgical intervention which was scheduled and postponed to Monday.  At visit, patient is stable without chest pain, shortness of breath or dizziness.  He is hemodynamically stable.  Patient is under care with setting of CAD.  He required surgical intervention.  He is comfortable at visit.  He will resume ASA 81mg with Sub Q hep prior to the surgery.  If there is signs of chest pain or EKG changes, patient may be required for early intervention.  Subliqual nitro or hep drip may be consider if there is chest pain.

## 2018-02-09 NOTE — CONSULT NOTE ADULT - PROBLEM SELECTOR RECOMMENDATION 9
Status post cardiac arrest and successful resuscitation.  The etiology of such arrest is most likely due to three vessel CAD with limited collateral flow based on the cath and angiogram showed post arrest.  Will need CABG.

## 2018-02-09 NOTE — PROGRESS NOTE ADULT - PROBLEM SELECTOR PLAN 6
F: no IVF indicated. Encourage PO intake   E: replete lytes prn with goal K>4 and Mg >2  N: Diet DASH/TLC

## 2018-02-09 NOTE — PROGRESS NOTE ADULT - PROBLEM SELECTOR PLAN 2
left heart catheterization 2/8/18 found 80% stenosis of RCA, 70% osteal stenosis of PDA with sequential lesions of 80-85% stenosis; patent LM; 50% osteal stenosis circumflex, 75% stenosis of mid circumflex, 80% stenosis mid to distal circumflex; 95-99% stenosis dOM1 with; 85% stenosis of pLAD and 70% stenosis dLAD.  LV gram 60% with no wall motion abnormality.  -Plan for CABG on Monday

## 2018-02-09 NOTE — CONSULT NOTE ADULT - ASSESSMENT
48 yo male with PMHx of HTN, HLD and CAD s/p left heart cath yesterday 2/8/18 revealing RCA 80% stenosis, osteal PDA 70%, osteal circumflex 75%, 75% stenosis of mid circumflex, 80% stenosis mid to distal circumflex, 95-99% stenosis dOM1, 85% stenosis of pLAD and 70% stenosis dLAD.  Transferred to the CCU yesterday from [facility] in [place] where he presented s/p cardiopulmonary arrest after playing a soccer match (CPR done on the field, defib x 2, ROSC achieved as per report).    For surgical intervention (CABG) with Dr. Nichole on Monday 2/12/18.

## 2018-02-09 NOTE — PROGRESS NOTE ADULT - PROBLEM SELECTOR PLAN 1
continue to cardiac monitor  if there is signs of chest pain or EKG changes, cardiac enzyme should be monitored.   remain on ASA 81mg for now prior to scheduled surgery  daily lab  CXR  OR on Monday

## 2018-02-09 NOTE — PROGRESS NOTE ADULT - PROBLEM SELECTOR PLAN 1
MALIK Score 3 (known CAD, >3 rsik factors for CAD, uses of ASA during the past 7 days), 13% risk at 14 days of all cause mortality. Pt with cardiac arrest lasting 2-4 min. S/p CPR and defibrillation with ROSC. S/p amiodarone 450 mg 16 cc/hr (0.5 mg /min x 18hrs) started at 9:28 AM on 2/18, stopped at 3:00 AM on 2/9; s/p ASA 81 mg /Plavix/Atorvastatin 80mg/Metoprolol 50 mg Q12  -Likely 2/2 multivessel CAD  -E[place] 2/8 from [place] with sinus rhythm with 1st degree heart block, Q wave in III, aVF.  -EKG on arrival with normal sinus rhythm, Q wave and T wave inversion in lead III, Q wave in aVF   -Episodes of multiple ventricular ectopy beats and non sustained VT during cardiac cath  -c/w amiodarone 400 mg PO daily  -c/w atorvastatin 80 mg PO daily, pt with mild transaminitis AST/ALT 42/86 likely 2/2 to cardiac arrest   -Will start Lisinopril 5 mg daily for 2 days than 10 mg daily for at least 6 weeks   -c/w metoprolol 50 mg PO BID  -Will f/u with CT surgery regarding continuing Plavix  -Trop T positive 0.02, will trend to peak. Possibly NSTEMI as trop I x3 initially negative in AM on 2/8 at 0.05, 0.02 and 0.27 at 9:00AM. However possibly elevated du to cardiac cath MALIK Score 3 (known CAD, >3 rsik factors for CAD, uses of ASA during the past 7 days), 13% risk at 14 days of all cause mortality. Pt with cardiac arrest lasting 2-4 min. S/p CPR and defibrillation with ROSC. S/p amiodarone 450 mg 16 cc/hr (0.5 mg /min x 18hrs) started at 9:28 AM on 2/18, stopped at 3:00 AM on 2/9; s/p ASA 81 mg /Plavix/Atorvastatin 80mg/Metoprolol 50 mg Q12  -Likely 2/2 multivessel CAD  -E[place] 2/8 from [place] with sinus rhythm with 1st degree heart block, Q wave in III, aVF.  -EKG on arrival with normal sinus rhythm, Q wave and T wave inversion in lead III, Q wave in aVF   -Episodes of multiple ventricular ectopy beats and non sustained VT during cardiac cath  -c/w amiodarone 400 mg PO daily  -c/w atorvastatin 80 mg PO daily, pt with mild transaminitis AST/ALT 42/86 likely 2/2 to cardiac arrest   -Will start Lisinopril 5 mg daily for 2 days than 10 mg daily for at least 6 weeks   -c/w metoprolol 50 mg PO BID  -Will f/u with CT surgery regarding continuing Plavix  -Trop T positive 0.02, will trend to peak. Possibly NSTEMI as trop I x3 initially negative in AM on 2/8 at 0.05, 0.02 and 0.27 at 9:00AM. However possibly elevated du to cardiac cath  -ECHO pending MALIK Score 3 (known CAD, >3 rsik factors for CAD, uses of ASA during the past 7 days), 13% risk at 14 days of all cause mortality. Pt with cardiac arrest lasting 2-4 min. S/p CPR and defibrillation with ROSC. S/p amiodarone 450 mg 16 cc/hr (0.5 mg /min x 18hrs) started at 9:28 AM on 2/18, stopped at 3:00 AM on 2/9; s/p ASA 81 mg /Plavix/Atorvastatin 80mg/Metoprolol 50 mg Q12  -Likely 2/2 multivessel CAD  -E[place] 2/8 from [place] with sinus rhythm with 1st degree heart block, Q wave in III, aVF.  -EKG on arrival with normal sinus rhythm, Q wave and T wave inversion in lead III, Q wave in aVF   -Episodes of multiple ventricular ectopy beats and non sustained VT during cardiac cath  -c/w amiodarone 400 mg PO daily  -c/w atorvastatin 80 mg PO daily, pt with mild transaminitis AST/ALT 42/86 likely 2/2 to cardiac arrest   -Will start Lisinopril 5 mg daily for 2 days than 10 mg daily for at least 6 weeks   -c/w metoprolol 50 mg PO BID  -Will f/u with CT surgery regarding continuing Plavix  -Trop T positive 0.02-->0.01, peaked. Unlikely NSTEMI as trop I x3 initially negative in AM on 2/8 at 0.05, 0.02 and 0.27 at 9:00AM. Possibility elevated du to cardiac cath  -ECHO pending MALIK Score 3 (known CAD, >3 rsik factors for CAD, uses of ASA during the past 7 days), 13% risk at 14 days of all cause mortality. Pt with cardiac arrest lasting 2-4 min. S/p CPR and defibrillation with ROSC. S/p amiodarone 450 mg 16 cc/hr (0.5 mg /min x 18hrs) started at 9:28 AM on 2/18, stopped at 3:00 AM on 2/9; s/p ASA 81 mg /Plavix/Atorvastatin 80mg/Metoprolol 50 mg Q12  -Likely 2/2 multivessel CAD  -E[place] 2/8 from [place] with sinus rhythm with 1st degree heart block, Q wave in III, aVF.  -EKG on arrival with normal sinus rhythm, Q wave and T wave inversion in lead III, Q wave in aVF   -Episodes of multiple ventricular ectopy beats and non sustained VT during cardiac cath  -c/w amiodarone 400 mg PO TID  -c/w atorvastatin 80 mg PO daily, pt with mild transaminitis AST/ALT 42/86 likely 2/2 to cardiac arrest   -c/w metoprolol 50 mg PO BID  -c/w ASA 81 mg daily  -Holding on starting Lisinopril 10 mg daily given pending surgery  -Trop T positive 0.02-->0.01, peaked. Unlikely NSTEMI as trop I x3 initially negative in AM on 2/8 at 0.05, 0.02 and 0.27 at 9:00AM. Possibility elevated du to cardiac cath  -ECHO pending

## 2018-02-09 NOTE — PROGRESS NOTE ADULT - SUBJECTIVE AND OBJECTIVE BOX
INTERVAL HPI/OVERNIGHT EVENTS: No acute events     ROS  CV: Denies chest pain, palpitations  RESP: Denies SOB  GI: Denies abdominal pain, constipation, diarrhea, nausea, vomiting  : Denies dysuria, hematuria, flank or back pain  ID: Denies fevers, chills  MSK: Denies joint pain   DERM: Denies any rashes, bruising, pruritis  NEURO: No headaches, blurry vision, double vision  ENDO: Denies heat or cold intolerances, changes in weight, thinning of hair  PSYCH: Denies any mood changes    VITAL SIGNS:  T(F): 98.7 (18 @ 01:22), Max: 98.7 (18 @ 01:22)  HR: 60 (18 @ 06:00) (56 - 74)  BP: 114/74 (18 @ 06:00) (101/59 - 144/87)  BP(mean): 91 (18 @ 06:00) (73 - 106)  RR: 14 (18 @ 06:00) (13 - 23)  SpO2: 97% (18 @ 06:00) (95% - 99%)    18 @ 07:01  -  18 @ 06:08  --------------------------------------------------------  IN: 590.3 mL / OUT: 300 mL / NET: 290.3 mL    PHYSICAL EXAM:  Constitutional: WDWN, NAD, resting comfortably   Head: NC/AT  Eyes: PERRL, EOMI, anicteric sclera, no mucosal pallor  ENT: no nasal discharge; uvula midline, no oropharyngeal erythema or exudates; MMM  Neck: supple; no JVD or thyromegaly, no lymphadenopathy  Respiratory: CTA B/L; no W/R/R, no retractions  Cardiac: +S1/S2; RRR; no M/R/G  Gastrointestinal: abdomen soft, NT/ND; no rebound or guarding; +BSx4  Back: spine midline, no bony tenderness or step-offs; no CVAT B/L  Extremities: warm; no calf tenderness, no pedal edema, no cyanosis, no clubbing  Musculoskeletal: NROM x4; no joint swelling, tenderness or erythema  Vascular: 2+ radial; DP/PT pulses B/L  Dermatologic: no rash, no petechia   Lymphatic: no submandibular or cervical LAD  Neurologic: AAOx3; CNII-XII grossly intact; 5/5 strength throughout, sensory symmetrically intact in B/L LE   Psychiatric: pleasant and conversive; affect and characteristics of appearance, verbalizations, behaviors are appropriate    MEDICATIONS  (STANDING):  amiodarone    Tablet 400 milliGRAM(s) Oral two times a day  chlorhexidine 0.12% Liquid 10 milliLiter(s) Swish and Spit once  metoprolol     tartrate 50 milliGRAM(s) Oral two times a day  potassium chloride    Tablet ER 20 milliEquivalent(s) Oral once    MEDICATIONS  (PRN):      Allergies    No Known Allergies    Intolerances        LABS:                        12.9   6.6   )-----------( 202      ( 2018 05:02 )             38.6         139  |  104  |  13  ----------------------------<  115<H>  3.9   |  22  |  0.87    Ca    8.7      2018 05:03  Mg     2.0         TPro  6.4  /  Alb  3.7  /  TBili  0.3  /  DBili  x   /  AST  42<H>  /  ALT  86<H>  /  AlkPhos  77  02-08    PT/INR - ( 2018 18:50 )   PT: 11.9 sec;   INR: 1.07          PTT - ( 2018 18:50 )  PTT:30.6 sec  Urinalysis Basic - ( 2018 19:10 )    Color: Yellow / Appearance: Clear / S.020 / pH: x  Gluc: x / Ketone: NEGATIVE  / Bili: Negative / Urobili: 0.2 E.U./dL   Blood: x / Protein: NEGATIVE mg/dL / Nitrite: NEGATIVE   Leuk Esterase: NEGATIVE / RBC: < 5 /HPF / WBC < 5 /HPF   Sq Epi: x / Non Sq Epi: 5-10 /HPF / Bacteria: Present /HPF      RADIOLOGY & ADDITIONAL TESTS:    EKG: Normal sinus rhythm, Q wave and T wave inversion in lead III, Q wave in aVF     < from: US Duplex Carotid Arteries Complete, Bilateral (18 @ 21:03) >    IMPRESSION:  1.  No hemodynamically significant carotid artery stenosis bilaterally.  2.  Mild calcified plaque is visualized within the bilateral internal   carotid arteries.    < end of copied text >    < from: Xray Chest 1 View-PORTABLE IMMEDIATE (18 @ 19:02) >  IMPRESSION: Clear lungs.    < end of copied text > INTERVAL HPI/OVERNIGHT EVENTS: No acute events     ROS  CV: Denies chest pain, palpitations  RESP: Denies SOB  GI: Denies abdominal pain, constipation, diarrhea, nausea, vomiting  : Denies dysuria, hematuria, flank or back pain  ID: Denies fevers, chills  MSK: Denies joint pain   DERM: Denies any rashes, bruising, pruritis  NEURO: No headaches, blurry vision, double vision    VITAL SIGNS:  T(F): 98.7 (18 @ 01:22), Max: 98.7 (18 @ 01:22)  HR: 60 (18 @ 06:00) (56 - 74)  BP: 114/74 (18 @ 06:00) (101/59 - 144/87)  BP(mean): 91 (18 @ 06:00) (73 - 106)  RR: 14 (18 @ 06:00) (13 - 23)  SpO2: 97% (18 @ 06:00) (95% - 99%)    18 @ 07:01  -  18 @ 06:08  --------------------------------------------------------  IN: 590.3 mL / OUT: 300 mL / NET: 290.3 mL    PHYSICAL EXAM:  Constitutional: WDWN, NAD, resting comfortably   Head: NC/AT  Eyes: PERRL, EOMI, anicteric sclera, no mucosal pallor  ENT: no nasal discharge; uvula midline, no oropharyngeal erythema or exudates; MMM  Neck: supple; no JVD or thyromegaly, no lymphadenopathy  Respiratory: CTA B/L; no W/R/R, no retractions  Cardiac: +S1/S2; RRR; no M/R/G  Gastrointestinal: abdomen soft, NT/ND; no rebound or guarding; +BSx4  Back: spine midline, no bony tenderness or step-offs; no CVAT B/L  Extremities: warm; no calf tenderness, no pedal edema, no cyanosis, no clubbing  Musculoskeletal: NROM x4; no joint swelling, tenderness or erythema  Vascular: 2+ radial; DP/PT pulses B/L  Dermatologic: no rash, no petechia   Lymphatic: no submandibular or cervical LAD  Neurologic: AAOx3; CNII-XII grossly intact; 5/5 strength throughout, sensory symmetrically intact in B/L LE   Psychiatric: pleasant and conversive; affect and characteristics of appearance, verbalizations, behaviors are appropriate    MEDICATIONS  (STANDING):  amiodarone    Tablet 400 milliGRAM(s) Oral two times a day  chlorhexidine 0.12% Liquid 10 milliLiter(s) Swish and Spit once  metoprolol     tartrate 50 milliGRAM(s) Oral two times a day  potassium chloride    Tablet ER 20 milliEquivalent(s) Oral once    MEDICATIONS  (PRN):      Allergies    No Known Allergies    Intolerances        LABS:                        12.9   6.6   )-----------( 202      ( 2018 05:02 )             38.6     02-09    139  |  104  |  13  ----------------------------<  115<H>  3.9   |  22  |  0.87    Ca    8.7      2018 05:03  Mg     2.0     02-09    TPro  6.4  /  Alb  3.7  /  TBili  0.3  /  DBili  x   /  AST  42<H>  /  ALT  86<H>  /  AlkPhos  77  02-08    CARDIAC MARKERS ( 2018 05:03 )  x     / <0.01 ng/mL / 298 U/L / x     / 2.8 ng/mL  CARDIAC MARKERS ( 2018 18:49 )  x     / 0.02 ng/mL / 341 U/L / x     / 3.6 ng/mL      PT/INR - ( 2018 18:50 )   PT: 11.9 sec;   INR: 1.07     PTT - ( 2018 18:50 )  PTT:30.6 sec  Urinalysis Basic - ( 2018 19:10 )    Color: Yellow / Appearance: Clear / S.020 / pH: x  Gluc: x / Ketone: NEGATIVE  / Bili: Negative / Urobili: 0.2 E.U./dL   Blood: x / Protein: NEGATIVE mg/dL / Nitrite: NEGATIVE   Leuk Esterase: NEGATIVE / RBC: < 5 /HPF / WBC < 5 /HPF   Sq Epi: x / Non Sq Epi: 5-10 /HPF / Bacteria: Present /HPF      RADIOLOGY & ADDITIONAL TESTS:    EKG: Normal sinus rhythm, Q wave and T wave inversion in lead III, Q wave in aVF     < from: US Duplex Carotid Arteries Complete, Bilateral (18 @ 21:03) >    IMPRESSION:  1.  No hemodynamically significant carotid artery stenosis bilaterally.  2.  Mild calcified plaque is visualized within the bilateral internal   carotid arteries.    < end of copied text >    < from: Xray Chest 1 View-PORTABLE IMMEDIATE (18 @ 19:02) >  IMPRESSION: Clear lungs.    < end of copied text > HOSPITAL COURSE:  46 yo M with PMHx HTN,HLD, CAD; FHx of cousin with sudden death from MI in his 40s, transferred to [facility] from [facility] after he initially presented s/p cardiopulmonary arrest while playing soccer.  Five years ago the patient had [facility] and was found to have triple vessel disease and refused CT surgery at that time.  He was playing soccer when he lost consciousness for 2-4 minutes. CPR was started on the field, pt was defibrillated twice and ROSC was achieved. He was brought by EMS to the hospital. He was started on amiodarone, admitted to the CCU, started on ASA/Plavix/atorvastatin.   Upon arrival to [place] on 18, vitals were stable (T97.5, HR 78, RR 20, SpO2 98%, /77). Labs were unremarkable for FSBG 137, Trop I 0.27, CKMB 3.7, BNP 18, PT/INR/PTT 12.6/1.1/29, BUN/Crt 14/1.03, mild transaminitis AST/ALT 72/113, Alk Phos 79, H/H 14.6/45. Chest X-ray with clear lungs. EKG showed sinus rhythm with 1st degree heart block, Q wave in III, aVF.  The patient underwent left heart catheterization 18 found 80% stenosis of RCA, 70% osteal stenosis of PDA with sequential lesions of 80-85% stenosis; patent LM; 50% osteal stenosis circumflex, 75% stenosis of mid circumflex, 80% stenosis mid to distal circumflex; 95-99% stenosis dOM1 with; 85% stenosis of pLAD and 70% stenosis dLAD.  LV gram 60% with no wall motion abnormality. The plan was discussed as multiple stents vs CABG and he declined the offer for intervention. He was transferred to [facility] for CABG, admitted to CCU for monitoring overnight prior to surgery. Upon arrival, pt was hemodynamically stable. ROS was negative. Labs were unchanged. Vitals remained stable. Carotid duplex showed no hemodynamically significant carotid artery stenosis bilaterally, mild calcified plaque is visualized within the bilateral internal carotid arteries.    INTERVAL HPI/OVERNIGHT EVENTS: No acute events     ROS  CV: Denies chest pain, palpitations  RESP: Denies SOB  GI: Denies abdominal pain, constipation, diarrhea, nausea, vomiting  : Denies dysuria, hematuria, flank or back pain  ID: Denies fevers, chills  MSK: Denies joint pain   DERM: Denies any rashes, bruising, pruritis  NEURO: No headaches, blurry vision, double vision    VITAL SIGNS:  T(F): 98.7 (18 @ 01:22), Max: 98.7 (18 @ 01:22)  HR: 60 (18 @ 06:00) (56 - 74)  BP: 114/74 (18 @ 06:00) (101/59 - 144/87)  BP(mean): 91 (18 @ 06:00) (73 - 106)  RR: 14 (18 @ 06:00) (13 - 23)  SpO2: 97% (18 @ 06:00) (95% - 99%)    18 @ 07:01  -  18 @ 06:08  --------------------------------------------------------  IN: 590.3 mL / OUT: 300 mL / NET: 290.3 mL    PHYSICAL EXAM:  Constitutional: WDWN, NAD, resting comfortably   Head: NC/AT  Eyes: PERRL, EOMI, anicteric sclera, no mucosal pallor  ENT: no nasal discharge; uvula midline, no oropharyngeal erythema or exudates; MMM  Neck: supple; no JVD or thyromegaly, no lymphadenopathy  Respiratory: CTA B/L; no W/R/R, no retractions  Cardiac: +S1/S2; RRR; no M/R/G  Gastrointestinal: abdomen soft, NT/ND; no rebound or guarding; +BSx4  Back: spine midline, no bony tenderness or step-offs; no CVAT B/L  Extremities: warm; no calf tenderness, no pedal edema, no cyanosis, no clubbing  Musculoskeletal: NROM x4; no joint swelling, tenderness or erythema  Vascular: 2+ radial; DP/PT pulses B/L  Dermatologic: no rash, no petechia   Lymphatic: no submandibular or cervical LAD  Neurologic: AAOx3; CNII-XII grossly intact; 5/5 strength throughout, sensory symmetrically intact in B/L LE   Psychiatric: pleasant and conversive; affect and characteristics of appearance, verbalizations, behaviors are appropriate    MEDICATIONS  (STANDING):  amiodarone    Tablet 400 milliGRAM(s) Oral two times a day  chlorhexidine 0.12% Liquid 10 milliLiter(s) Swish and Spit once  metoprolol     tartrate 50 milliGRAM(s) Oral two times a day  potassium chloride    Tablet ER 20 milliEquivalent(s) Oral once    MEDICATIONS  (PRN):      Allergies    No Known Allergies    Intolerances        LABS:                        12.9   6.6   )-----------( 202      ( 2018 05:02 )             38.6     02-    139  |  104  |  13  ----------------------------<  115<H>  3.9   |  22  |  0.87    Ca    8.7      2018 05:03  Mg     2.0     -    TPro  6.4  /  Alb  3.7  /  TBili  0.3  /  DBili  x   /  AST  42<H>  /  ALT  86<H>  /  AlkPhos  77  02-08    CARDIAC MARKERS ( 2018 05:03 )  x     / <0.01 ng/mL / 298 U/L / x     / 2.8 ng/mL  CARDIAC MARKERS ( 2018 18:49 )  x     / 0.02 ng/mL / 341 U/L / x     / 3.6 ng/mL      PT/INR - ( 2018 18:50 )   PT: 11.9 sec;   INR: 1.07     PTT - ( 2018 18:50 )  PTT:30.6 sec  Urinalysis Basic - ( 2018 19:10 )    Color: Yellow / Appearance: Clear / S.020 / pH: x  Gluc: x / Ketone: NEGATIVE  / Bili: Negative / Urobili: 0.2 E.U./dL   Blood: x / Protein: NEGATIVE mg/dL / Nitrite: NEGATIVE   Leuk Esterase: NEGATIVE / RBC: < 5 /HPF / WBC < 5 /HPF   Sq Epi: x / Non Sq Epi: 5-10 /HPF / Bacteria: Present /HPF      RADIOLOGY & ADDITIONAL TESTS:    EKG: Normal sinus rhythm, Q wave and T wave inversion in lead III, Q wave in aVF     < from: US Duplex Carotid Arteries Complete, Bilateral (18 @ 21:03) >    IMPRESSION:  1.  No hemodynamically significant carotid artery stenosis bilaterally.  2.  Mild calcified plaque is visualized within the bilateral internal   carotid arteries.    < end of copied text >    < from: Xray Chest 1 View-PORTABLE IMMEDIATE (18 @ 19:02) >  IMPRESSION: Clear lungs.    < end of copied text > HOSPITAL COURSE:  46 yo M with PMHx HTN,HLD, CAD; FHx of cousin with sudden death from MI in his 40s, transferred to [facility] from [facility] after he initially presented s/p cardiopulmonary arrest while playing soccer.  Five years ago the patient had [facility] and was found to have triple vessel disease and refused CT surgery at that time.  He was playing soccer when he lost consciousness for 2-4 minutes. CPR was started on the field, pt was defibrillated twice and ROSC was achieved. He was brought by EMS to the hospital. He was started on amiodarone, admitted to the CCU, started on ASA/Plavix/atorvastatin.   Upon arrival to [place] on 18, vitals were stable (T97.5, HR 78, RR 20, SpO2 98%, /77). Labs were unremarkable for FSBG 137, Trop I 0.27, CKMB 3.7, BNP 18, PT/INR/PTT 12.6/1.1/29, BUN/Crt 14/1.03, mild transaminitis AST/ALT 72/113, Alk Phos 79, H/H 14.6/45. Chest X-ray with clear lungs. EKG showed sinus rhythm with 1st degree heart block, Q wave in III, aVF.  The patient underwent left heart catheterization 18 found 80% stenosis of RCA, 70% osteal stenosis of PDA with sequential lesions of 80-85% stenosis; patent LM; 50% osteal stenosis circumflex, 75% stenosis of mid circumflex, 80% stenosis mid to distal circumflex; 95-99% stenosis dOM1 with; 85% stenosis of pLAD and 70% stenosis dLAD.  LV gram 60% with no wall motion abnormality. The plan was discussed as multiple stents vs CABG and he declined the offer for intervention. He was transferred to [facility] for CABG, admitted to CCU for monitoring overnight prior to surgery. Upon arrival, pt was hemodynamically stable. ROS was negative. Labs were unchanged. Vitals remained stable. Carotid duplex showed no hemodynamically significant carotid artery stenosis bilaterally, mild calcified plaque is visualized within the bilateral internal carotid arteries.    INTERVAL HPI/OVERNIGHT EVENTS: No acute events     ROS  CV: Denies chest pain, palpitations  RESP: Denies SOB  GI: Denies abdominal pain, constipation, diarrhea, nausea, vomiting  : Denies dysuria, hematuria, flank or back pain  ID: Denies fevers, chills  MSK: Denies joint pain   DERM: Denies any rashes, bruising, pruritis  NEURO: No headaches, blurry vision, double vision    VITAL SIGNS:  T(F): 98.7 (18 @ 01:22), Max: 98.7 (18 @ 01:22)  HR: 60 (18 @ 06:00) (56 - 74)  BP: 114/74 (18 @ 06:00) (101/59 - 144/87)  BP(mean): 91 (18 @ 06:00) (73 - 106)  RR: 14 (18 @ 06:00) (13 - 23)  SpO2: 97% (18 @ 06:00) (95% - 99%)    18 @ 07:01  -  18 @ 06:08  --------------------------------------------------------  IN: 590.3 mL / OUT: 300 mL / NET: 290.3 mL    PHYSICAL EXAM:  Constitutional: WDWN, NAD, resting comfortably   Head: NC/AT  Eyes: PERRL, EOMI, anicteric sclera, no mucosal pallor  ENT: no nasal discharge; uvula midline, no oropharyngeal erythema or exudates; MMM  Neck: supple; no JVD or thyromegaly, no lymphadenopathy  Respiratory: mild RLL crackles; no W/R/R, no retractions  Cardiac: +S1/S2; RRR; no M/R/G  Gastrointestinal: abdomen soft, NT/ND; no rebound or guarding; +BSx4  Back: spine midline, no bony tenderness or step-offs; no CVAT B/L  Extremities: warm; no calf tenderness, no pedal edema, no cyanosis, no clubbing  Musculoskeletal: NROM x4; no joint swelling, tenderness or erythema  Vascular: 2+ radial; DP/PT pulses B/L  Dermatologic: no rash, no petechia   Lymphatic: no submandibular or cervical LAD  Neurologic: AAOx3; CNII-XII grossly intact; 5/5 strength throughout, sensory symmetrically intact in B/L LE   Psychiatric: pleasant and conversive; affect and characteristics of appearance, verbalizations, behaviors are appropriate    MEDICATIONS  (STANDING):  amiodarone    Tablet 400 milliGRAM(s) Oral two times a day  chlorhexidine 0.12% Liquid 10 milliLiter(s) Swish and Spit once  metoprolol     tartrate 50 milliGRAM(s) Oral two times a day  potassium chloride    Tablet ER 20 milliEquivalent(s) Oral once    MEDICATIONS  (PRN):      Allergies    No Known Allergies    Intolerances        LABS:                        12.9   6.6   )-----------( 202      ( 2018 05:02 )             38.6     02-    139  |  104  |  13  ----------------------------<  115<H>  3.9   |  22  |  0.87    Ca    8.7      2018 05:03  Mg     2.0     -    TPro  6.4  /  Alb  3.7  /  TBili  0.3  /  DBili  x   /  AST  42<H>  /  ALT  86<H>  /  AlkPhos  77  02-08    CARDIAC MARKERS ( 2018 05:03 )  x     / <0.01 ng/mL / 298 U/L / x     / 2.8 ng/mL  CARDIAC MARKERS ( 2018 18:49 )  x     / 0.02 ng/mL / 341 U/L / x     / 3.6 ng/mL      PT/INR - ( 2018 18:50 )   PT: 11.9 sec;   INR: 1.07     PTT - ( 2018 18:50 )  PTT:30.6 sec  Urinalysis Basic - ( 2018 19:10 )    Color: Yellow / Appearance: Clear / S.020 / pH: x  Gluc: x / Ketone: NEGATIVE  / Bili: Negative / Urobili: 0.2 E.U./dL   Blood: x / Protein: NEGATIVE mg/dL / Nitrite: NEGATIVE   Leuk Esterase: NEGATIVE / RBC: < 5 /HPF / WBC < 5 /HPF   Sq Epi: x / Non Sq Epi: 5-10 /HPF / Bacteria: Present /HPF      RADIOLOGY & ADDITIONAL TESTS:    EKG: Normal sinus rhythm, Q wave and T wave inversion in lead III, Q wave in aVF     < from: US Duplex Carotid Arteries Complete, Bilateral (18 @ 21:03) >    IMPRESSION:  1.  No hemodynamically significant carotid artery stenosis bilaterally.  2.  Mild calcified plaque is visualized within the bilateral internal   carotid arteries.    < end of copied text >    < from: Xray Chest 1 View-PORTABLE IMMEDIATE (18 @ 19:02) >  IMPRESSION: Clear lungs.    < end of copied text >

## 2018-02-09 NOTE — PROGRESS NOTE ADULT - SUBJECTIVE AND OBJECTIVE BOX
Patient discussed on morning rounds with Dr. Nichole    Operation / Date: 18 CABG    SUBJECTIVE ASSESSMENT:  47y Male witg PMH significant for HTN and HLD presented to CT surgery with findings of CAD requiring for surgical intervention which was scheduled and postponed to Monday.      At visit, patient is stable without chest pain, shortness of breath or dizziness.  He is hemodynamically stable.      Vital Signs Last 24 Hrs  T(C): 37.5 (2018 07:15), Max: 37.5 (2018 07:15)  T(F): 99.5 (2018 07:15), Max: 99.5 (2018 07:15)  HR: 72 (2018 08:00) (56 - 74)  BP: 138/87 (2018 08:00) (101/59 - 144/87)  BP(mean): 105 (2018 08:00) (73 - 106)  RR: 18 (2018 08:00) (13 - 23)  SpO2: 98% (2018 08:00) (95% - 99%)  I&O's Detail    2018 07:01  -  2018 07:00  --------------------------------------------------------  IN:    amiodarone Infusion: 150.3 mL    Oral Fluid: 565 mL  Total IN: 715.3 mL    OUT:    Voided: 300 mL  Total OUT: 300 mL    Total NET: 415.3 mL      PHYSICAL EXAM:    General: NAD    Neurological: grossly intact    Cardiovascular: RRR without murmur    Respiratory: at room air    Gastrointestinal: Bowel sound and movement intact.  Food intake     Extremities: no edema    Vascular: pulses in tact    LABS:                        12.9   6.6   )-----------( 202      ( 2018 05:02 )             38.6     PT/INR - ( 2018 18:50 )   PT: 11.9 sec;   INR: 1.07     PTT - ( 2018 18:50 )  PTT:30.6 sec        139  |  104  |  13  ----------------------------<  115<H>  3.9   |  22  |  0.87    Ca    8.7      2018 05:03  Mg     2.0     -    TPro  6.4  /  Alb  3.7  /  TBili  0.3  /  DBili  x   /  AST  42<H>  /  ALT  86<H>  /  AlkPhos  77        Urinalysis Basic - ( 2018 19:10 )    Color: Yellow / Appearance: Clear / S.020 / pH: x  Gluc: x / Ketone: NEGATIVE  / Bili: Negative / Urobili: 0.2 E.U./dL   Blood: x / Protein: NEGATIVE mg/dL / Nitrite: NEGATIVE   Leuk Esterase: NEGATIVE / RBC: < 5 /HPF / WBC < 5 /HPF   Sq Epi: x / Non Sq Epi: 5-10 /HPF / Bacteria: Present /HPF        MEDICATIONS  (STANDING):  amiodarone    Tablet 400 milliGRAM(s) Oral two times a day  aspirin enteric coated 81 milliGRAM(s) Oral daily  atorvastatin 80 milliGRAM(s) Oral at bedtime  chlorhexidine 0.12% Liquid 10 milliLiter(s) Swish and Spit once  metoprolol     tartrate 50 milliGRAM(s) Oral two times a day    MEDICATIONS  (PRN):        RADIOLOGY & ADDITIONAL TESTS:

## 2018-02-10 LAB
ANION GAP SERPL CALC-SCNC: 11 MMOL/L — SIGNIFICANT CHANGE UP (ref 5–17)
BUN SERPL-MCNC: 18 MG/DL — SIGNIFICANT CHANGE UP (ref 7–23)
CALCIUM SERPL-MCNC: 9.3 MG/DL — SIGNIFICANT CHANGE UP (ref 8.4–10.5)
CHLORIDE SERPL-SCNC: 104 MMOL/L — SIGNIFICANT CHANGE UP (ref 96–108)
CO2 SERPL-SCNC: 24 MMOL/L — SIGNIFICANT CHANGE UP (ref 22–31)
CREAT SERPL-MCNC: 0.93 MG/DL — SIGNIFICANT CHANGE UP (ref 0.5–1.3)
GLUCOSE SERPL-MCNC: 128 MG/DL — HIGH (ref 70–99)
MAGNESIUM SERPL-MCNC: 2.2 MG/DL — SIGNIFICANT CHANGE UP (ref 1.6–2.6)
PHOSPHATE SERPL-MCNC: 3 MG/DL — SIGNIFICANT CHANGE UP (ref 2.5–4.5)
POTASSIUM SERPL-MCNC: 4.3 MMOL/L — SIGNIFICANT CHANGE UP (ref 3.5–5.3)
POTASSIUM SERPL-SCNC: 4.3 MMOL/L — SIGNIFICANT CHANGE UP (ref 3.5–5.3)
SODIUM SERPL-SCNC: 139 MMOL/L — SIGNIFICANT CHANGE UP (ref 135–145)

## 2018-02-10 PROCEDURE — 99291 CRITICAL CARE FIRST HOUR: CPT

## 2018-02-10 RX ORDER — METOPROLOL TARTRATE 50 MG
25 TABLET ORAL EVERY 12 HOURS
Qty: 0 | Refills: 0 | Status: DISCONTINUED | OUTPATIENT
Start: 2018-02-10 | End: 2018-02-12

## 2018-02-10 RX ADMIN — AMIODARONE HYDROCHLORIDE 400 MILLIGRAM(S): 400 TABLET ORAL at 22:05

## 2018-02-10 RX ADMIN — ISOSORBIDE MONONITRATE 30 MILLIGRAM(S): 60 TABLET, EXTENDED RELEASE ORAL at 11:58

## 2018-02-10 RX ADMIN — AMIODARONE HYDROCHLORIDE 400 MILLIGRAM(S): 400 TABLET ORAL at 07:02

## 2018-02-10 RX ADMIN — AMIODARONE HYDROCHLORIDE 400 MILLIGRAM(S): 400 TABLET ORAL at 14:24

## 2018-02-10 RX ADMIN — Medication 25 MILLIGRAM(S): at 18:14

## 2018-02-10 RX ADMIN — Medication 81 MILLIGRAM(S): at 11:58

## 2018-02-10 RX ADMIN — Medication 50 MILLIGRAM(S): at 07:02

## 2018-02-10 RX ADMIN — ATORVASTATIN CALCIUM 80 MILLIGRAM(S): 80 TABLET, FILM COATED ORAL at 22:05

## 2018-02-10 NOTE — PROGRESS NOTE ADULT - SUBJECTIVE AND OBJECTIVE BOX
INTERVAL HPI/OVERNIGHT EVENTS:    ROS  CV: Denies chest pain, palpitations  RESP: Denies SOB  GI: Denies abdominal pain, constipation, diarrhea, nausea, vomiting  : Denies dysuria, hematuria, flank or back pain  ID: Denies fevers, chills  MSK: Denies joint pain   DERM: Denies any rashes, bruising, pruritis  NEURO: No headaches, blurry vision, double vision  ENDO: Denies heat or cold intolerances, changes in weight, thinning of hair  PSYCH: Denies any mood changes    VITAL SIGNS:  T(F): 98.3 (18 @ 22:36), Max: 99.5 (18 @ 07:15)  HR: 60 (02-10-18 @ 06:00) (52 - 78)  BP: 123/77 (02-10-18 @ 06:00) (92/59 - 148/93)  BP(mean): 95 (02-10-18 @ 06:00) (73 - 113)  RR: 18 (02-10-18 @ 06:00) (11 - 26)  SpO2: 96% (02-10-18 @ 06:00) (94% - 98%)    18 @ 07:01  -  18 @ 07:00  --------------------------------------------------------  IN: 715.3 mL / OUT: 300 mL / NET: 415.3 mL    18 @ 07:01  -  02-10-18 @ 06:39  --------------------------------------------------------  IN: 100 mL / OUT: 0 mL / NET: 100 mL        PHYSICAL EXAM:    Constitutional: WDWN, NAD, resting comfortably   Head: NC/AT  Eyes: PERRL, EOMI, anicteric sclera, no mucosal pallor  ENT: no nasal discharge; uvula midline, no oropharyngeal erythema or exudates; MMM  Neck: supple; no JVD or thyromegaly, no lymphadenopathy  Respiratory: CTA B/L; no W/R/R, no retractions  Cardiac: +S1/S2; RRR; no M/R/G; PMI non-displaced  Gastrointestinal: abdomen soft, NT/ND; no rebound or guarding; +BSx4  Back: spine midline, no bony tenderness or step-offs; no CVAT B/L  Extremities: warm; no calf tenderness, no pedal edema, no cyanosis, no clubbing  Musculoskeletal: NROM x4; no joint swelling, tenderness or erythema  Vascular: 2+ radial, femoral; DP/PT pulses B/L  Dermatologic: no rash, no petechia   Lymphatic: no submandibular or cervical LAD  Neurologic: AAOx3; CNII-XII grossly intact; 5/5 strength throughout, sensory symmetrically intact in B/L LE   Psychiatric: pleasant and conversive; affect and characteristics of appearance, verbalizations, behaviors are appropriate    MEDICATIONS  (STANDING):  amiodarone    Tablet 400 milliGRAM(s) Oral three times a day  aspirin enteric coated 81 milliGRAM(s) Oral daily  atorvastatin 80 milliGRAM(s) Oral at bedtime  chlorhexidine 0.12% Liquid 10 milliLiter(s) Swish and Spit once  isosorbide   mononitrate ER Tablet (IMDUR) 30 milliGRAM(s) Oral daily  metoprolol     tartrate 50 milliGRAM(s) Oral two times a day    MEDICATIONS  (PRN):      Allergies    No Known Allergies    Intolerances        LABS:                        12.9   6.6   )-----------( 202      ( 2018 05:02 )             38.6         139  |  104  |  13  ----------------------------<  115<H>  3.9   |  22  |  0.87    Ca    8.7      2018 05:03  Mg     2.0         TPro  6.4  /  Alb  3.7  /  TBili  0.3  /  DBili  x   /  AST  42<H>  /  ALT  86<H>  /  AlkPhos  77  02-08    PT/INR - ( 2018 18:50 )   PT: 11.9 sec;   INR: 1.07          PTT - ( 2018 18:50 )  PTT:30.6 sec  Urinalysis Basic - ( 2018 19:10 )    Color: Yellow / Appearance: Clear / S.020 / pH: x  Gluc: x / Ketone: NEGATIVE  / Bili: Negative / Urobili: 0.2 E.U./dL   Blood: x / Protein: NEGATIVE mg/dL / Nitrite: NEGATIVE   Leuk Esterase: NEGATIVE / RBC: < 5 /HPF / WBC < 5 /HPF   Sq Epi: x / Non Sq Epi: 5-10 /HPF / Bacteria: Present /HPF        RADIOLOGY & ADDITIONAL TESTS: INTERVAL HPI/OVERNIGHT EVENTS: No acute events overnight     ROS  CV: Denies chest pain, palpitations  RESP: Denies SOB  GI: Denies abdominal pain, constipation, diarrhea, nausea, vomiting  : Denies dysuria, hematuria, flank or back pain  ID: Denies fevers, chills  MSK: Denies joint pain   DERM: Denies any rashes, bruising, pruritis  NEURO: No headaches, blurry vision, double vision    ICU Vital Signs Last 24 Hrs  T(C): 36.3 (10 Feb 2018 09:40), Max: 36.8 (2018 22:36)  T(F): 97.4 (10 Feb 2018 09:40), Max: 98.3 (2018 22:36)  HR: 52 (10 Feb 2018 11:00) (52 - 78)  BP: 151/81 (10 Feb 2018 11:00) (92/59 - 151/81)  BP(mean): 121 (10 Feb 2018 11:00) (73 - 121)  RR: 19 (10 Feb 2018 11:00) (11 - 26)  SpO2: 98% (10 Feb 2018 11:00) (94% - 98%)    18 @ 07:01  -  18 @ 07:00  --------------------------------------------------------  IN: 715.3 mL / OUT: 300 mL / NET: 415.3 mL    18 @ 07:01  -  02-10-18 @ 06:39  --------------------------------------------------------  IN: 100 mL / OUT: 0 mL / NET: 100 mL    PHYSICAL EXAM:    Constitutional: WDWN, NAD, resting comfortably   Head: NC/AT  Eyes: PERRL, EOMI, anicteric sclera, no mucosal pallor  ENT: no nasal discharge; uvula midline, no oropharyngeal erythema or exudates; MMM  Neck: supple; no JVD or thyromegaly, no lymphadenopathy  Respiratory: CTA B/L; no W/R/R, no retractions  Cardiac: +S1/S2; RRR; no M/R/G; PMI non-displaced  Gastrointestinal: abdomen soft, NT/ND; no rebound or guarding; +BSx4  Back: spine midline, no bony tenderness or step-offs; no CVAT B/L  Extremities: warm; no calf tenderness, no pedal edema, no cyanosis, no clubbing  Musculoskeletal: NROM x4; no joint swelling, tenderness or erythema  Vascular: 2+ radial; DP/PT pulses B/L  Neurologic: AAOx3; CNII-XII grossly intact; 5/5 strength throughout, sensory symmetrically intact in B/L LE   Psychiatric: pleasant and conversive; affect and characteristics of appearance, verbalizations, behaviors are appropriate    MEDICATIONS  (STANDING):  amiodarone    Tablet 400 milliGRAM(s) Oral three times a day  aspirin enteric coated 81 milliGRAM(s) Oral daily  atorvastatin 80 milliGRAM(s) Oral at bedtime  chlorhexidine 0.12% Liquid 10 milliLiter(s) Swish and Spit once  isosorbide   mononitrate ER Tablet (IMDUR) 30 milliGRAM(s) Oral daily  metoprolol     tartrate 50 milliGRAM(s) Oral two times a day    MEDICATIONS  (PRN):      Allergies    No Known Allergies    Intolerances        LABS:                   12.9   6.6   )-----------( 202      ( 2018 05:02 )             38.6   02-10    139  |  104  |  18  ----------------------------<  128<H>  4.3   |  24  |  0.93    Ca    9.3      10 Feb 2018 07:06  Phos  3.0     02-10  Mg     2.2     02-10    TPro  6.4  /  Alb  3.7  /  TBili  0.3  /  DBili  x   /  AST  42<H>  /  ALT  86<H>  /  AlkPhos  77  02-08    02-09    139  |  104  |  13  ----------------------------<  115<H>  3.9   |  22  |  0.87    Ca    8.7      2018 05:03  Mg     2.0     -    TPro  6.4  /  Alb  3.7  /  TBili  0.3  /  DBili  x   /  AST  42<H>  /  ALT  86<H>  /  AlkPhos  77  02-08    PT/INR - ( 2018 18:50 )   PT: 11.9 sec;   INR: 1.07          PTT - ( 2018 18:50 )  PTT:30.6 sec  Urinalysis Basic - ( 2018 19:10 )    Color: Yellow / Appearance: Clear / S.020 / pH: x  Gluc: x / Ketone: NEGATIVE  / Bili: Negative / Urobili: 0.2 E.U./dL   Blood: x / Protein: NEGATIVE mg/dL / Nitrite: NEGATIVE   Leuk Esterase: NEGATIVE / RBC: < 5 /HPF / WBC < 5 /HPF   Sq Epi: x / Non Sq Epi: 5-10 /HPF / Bacteria: Present /HPF        RADIOLOGY & ADDITIONAL TESTS:

## 2018-02-10 NOTE — PROGRESS NOTE ADULT - PROBLEM SELECTOR PLAN 1
MALIK Score 3 (known CAD, >3 rsik factors for CAD, uses of ASA during the past 7 days), 13% risk at 14 days of all cause mortality. Pt with cardiac arrest lasting 2-4 min. S/p CPR and defibrillation with ROSC. S/p amiodarone 450 mg 16 cc/hr (0.5 mg /min x 18hrs) started at 9:28 AM on 2/18, stopped at 3:00 AM on 2/9; s/p ASA 81 mg /Plavix/Atorvastatin 80mg/Metoprolol 50 mg Q12  -Likely 2/2 multivessel CAD  -EKG 2/8 from [facility] with sinus rhythm with 1st degree heart block, Q wave in III, aVF.  -EKG on arrival with normal sinus rhythm, Q wave and T wave inversion in lead III, Q wave in aVF   -Episodes of multiple ventricular ectopy beats and non sustained VT during cardiac cath  -c/w amiodarone 400 mg PO TID  -c/w atorvastatin 80 mg PO daily, pt with mild transaminitis AST/ALT 42/86 likely 2/2 to cardiac arrest   -c/w metoprolol 50 mg PO BID  -c/w ASA 81 mg daily  -Holding on starting Lisinopril 10 mg daily given pending surgery  -Trop T positive 0.02-->0.01, peaked. Unlikely NSTEMI as trop I x3 initially negative in AM on 2/8 at 0.05, 0.02 and 0.27 at 9:00AM. Possibility elevated du to cardiac cath  -ECHO pending MALIK Score 3 (known CAD, >3 rsik factors for CAD, uses of ASA during the past 7 days), 13% risk at 14 days of all cause mortality. Pt with cardiac arrest lasting 2-4 min. S/p CPR and defibrillation with ROSC. S/p amiodarone 450 mg 16 cc/hr (0.5 mg /min x 18hrs) started at 9:28 AM on 2/18, stopped at 3:00 AM on 2/9; s/p ASA 81 mg /Plavix/Atorvastatin 80mg/Metoprolol 50 mg Q12  -Likely 2/2 multivessel CAD  -EKG 2/8 from [facility] with sinus rhythm with 1st degree heart block, Q wave in III, aVF.  -EKG on arrival with normal sinus rhythm, Q wave and T wave inversion in lead III, Q wave in aVF   -Episodes of multiple ventricular ectopy beats and non sustained VT during cardiac cath  -c/w amiodarone 400 mg PO TID  -c/w atorvastatin 80 mg PO daily, pt with mild transaminitis AST/ALT 42/86 likely 2/2 to cardiac arrest   -c/w metoprolol 50 mg PO BID  -c/w ASA 81 mg daily  -Holding on starting Lisinopril 10 mg daily given pending surgery  -Trop T positive 0.02-->0.01, peaked. Unlikely NSTEMI as trop I x3 initially negative in AM on 2/8 at 0.05, 0.02 and 0.27 at 9:00AM. Possibility elevated du to cardiac cath  -ECHO 2/9 with LVEF 63; mildly dilated LA; borderline dilate RV; trace MR

## 2018-02-10 NOTE — PROGRESS NOTE ADULT - ATTENDING COMMENTS
47M with Essential HTN,HLD, CAD; FHx of cousin with SCD age 40 who was transferred to [facility] from [facility] after presentation with cardiac arrest during soccer game.  Pt reports he is aware of 3vCAD diagnosis for years and has been asymptomatic. Previously offered CABG which patient declined. OSH C 2/8/18 confirmed 3vCAD with small collaterals to LCX.        Vitals, EKG, labs and imaging reviewed 2/10  Carotid U/S with no HD significant obstruction  Exam notable for middle aged male sitting up in bed in NAD, fiance at bedside. Flat neck veins, RRR, no MGR detected, clear lungs, no IRA, extremities WWP, A&Ox3 No changes noted.    47 M HTN CAD hyperlipidemia LVEF 60% with severe multivessel disease. Cardiac arrest s/p CPR.     -Cont ASA 81mg daily  -Cont Amiodarone 400 TID    -Cont Metoprolol 50 BID  -High intensity statin Atorva 80 qHS  - Hold plavix prior to surgery.  - Patient for CABG on Monday.
47M with Essential HTN,HLD, CAD; FHx of cousin with SCD age 40 who was transferred to [facility] from [facility] after presentation with cardiac arrest during soccer game.  Pt reports he is aware of 3vCAD diagnosis for years and has been asymptomatic. Previously offered CABG which patient declined. OSH C 2/8/18 confirmed 3vCAD with small collaterals to LCX. Upon arrival to CCU overnight and upon conversation this morning, pt refusing CABG.  Fiance insistent that patient had cardiac arrest due to primary arrhythmogenic event and states the patient did not have a heart attack because troponins were negative.  Fiance and patient also argue that patient arrested likely because of his medications, particularly because of the beta blocker.    Vitals, EKG, labs and imaging reviewed 2/9  Carotid U/S with no HD significant obstruction  Exam notable for middle aged male sitting up in bed in NAD, chantel at bedside. Flat neck veins, RRR, no MGR detected, clear lungs, no IRA, extremities WWP, A&Ox3    -Cont ASA 81mg daily  -Cont Amiodarone 400 TID (goal 8-10gm load)  -Cont Metoprolol 50 BID  -High intensity statin Atorva 80 qHS  -Add Imdur 30 if needing additional BP control as also has antianginal properties  -If pt c/o CP or anginal equivalents, check troponin, EKG and start Heparin gtt  -HOLDING PLAVIX prior to surgery  -Formal TTE pending  -Bedside spirometry pending  -Maintain K4.5, Mg 2.5  -Patient and chantele had extensive conversation with primary cardiologist, Dr. Monge, and patient is now agreeable to CABG   -OR rescheduled to Monday 2/12  -Pt to remain in CCU until OR    MD Megha  CCU Attending

## 2018-02-10 NOTE — PROGRESS NOTE ADULT - ASSESSMENT
48 yo M with PMHx HTN,HLD, CAD; FHx of cousin with sudden death from MI in his 40s, transferred to [facility] from [facility] after he initially presented s/p cardiopulmonary arrest while playing soccer. Left heart catheterization 2/8/18 found multiple vessel CAD. He was transferred to [facility] for CABG, admitted to CCU for monitoring overnight prior to surgery.    LVEF 63; mildly dilated LA; borderline dilate RV; trace MR 46 yo M with PMHx HTN,HLD, CAD; FHx of cousin with sudden death from MI in his 40s, transferred to [facility] from [facility] after he initially presented s/p cardiopulmonary arrest while playing soccer. Left heart catheterization 2/8/18 found multiple vessel CAD. He was transferred to [facility] for CABG, admitted to CCU for monitoring overnight prior to surgery.

## 2018-02-11 LAB
ANION GAP SERPL CALC-SCNC: 14 MMOL/L — SIGNIFICANT CHANGE UP (ref 5–17)
BUN SERPL-MCNC: 21 MG/DL — SIGNIFICANT CHANGE UP (ref 7–23)
CALCIUM SERPL-MCNC: 9.4 MG/DL — SIGNIFICANT CHANGE UP (ref 8.4–10.5)
CHLORIDE SERPL-SCNC: 103 MMOL/L — SIGNIFICANT CHANGE UP (ref 96–108)
CO2 SERPL-SCNC: 23 MMOL/L — SIGNIFICANT CHANGE UP (ref 22–31)
CREAT SERPL-MCNC: 0.97 MG/DL — SIGNIFICANT CHANGE UP (ref 0.5–1.3)
GLUCOSE SERPL-MCNC: 120 MG/DL — HIGH (ref 70–99)
MAGNESIUM SERPL-MCNC: 2.2 MG/DL — SIGNIFICANT CHANGE UP (ref 1.6–2.6)
PHOSPHATE SERPL-MCNC: 3.6 MG/DL — SIGNIFICANT CHANGE UP (ref 2.5–4.5)
POTASSIUM SERPL-MCNC: 3.8 MMOL/L — SIGNIFICANT CHANGE UP (ref 3.5–5.3)
POTASSIUM SERPL-SCNC: 3.8 MMOL/L — SIGNIFICANT CHANGE UP (ref 3.5–5.3)
SODIUM SERPL-SCNC: 140 MMOL/L — SIGNIFICANT CHANGE UP (ref 135–145)

## 2018-02-11 PROCEDURE — 99233 SBSQ HOSP IP/OBS HIGH 50: CPT

## 2018-02-11 PROCEDURE — 93010 ELECTROCARDIOGRAM REPORT: CPT

## 2018-02-11 PROCEDURE — 70450 CT HEAD/BRAIN W/O DYE: CPT | Mod: 26

## 2018-02-11 RX ORDER — CHLORHEXIDINE GLUCONATE 213 G/1000ML
1 SOLUTION TOPICAL ONCE
Qty: 0 | Refills: 0 | Status: COMPLETED | OUTPATIENT
Start: 2018-02-11 | End: 2018-02-12

## 2018-02-11 RX ORDER — CHLORHEXIDINE GLUCONATE 213 G/1000ML
1 SOLUTION TOPICAL ONCE
Qty: 0 | Refills: 0 | Status: COMPLETED | OUTPATIENT
Start: 2018-02-11 | End: 2018-02-11

## 2018-02-11 RX ORDER — POTASSIUM CHLORIDE 20 MEQ
20 PACKET (EA) ORAL ONCE
Qty: 0 | Refills: 0 | Status: COMPLETED | OUTPATIENT
Start: 2018-02-11 | End: 2018-02-11

## 2018-02-11 RX ORDER — CHLORHEXIDINE GLUCONATE 213 G/1000ML
30 SOLUTION TOPICAL ONCE
Qty: 0 | Refills: 0 | Status: COMPLETED | OUTPATIENT
Start: 2018-02-11 | End: 2018-02-11

## 2018-02-11 RX ADMIN — ISOSORBIDE MONONITRATE 30 MILLIGRAM(S): 60 TABLET, EXTENDED RELEASE ORAL at 11:34

## 2018-02-11 RX ADMIN — Medication 25 MILLIGRAM(S): at 21:56

## 2018-02-11 RX ADMIN — Medication 81 MILLIGRAM(S): at 11:34

## 2018-02-11 RX ADMIN — CHLORHEXIDINE GLUCONATE 1 APPLICATION(S): 213 SOLUTION TOPICAL at 22:49

## 2018-02-11 RX ADMIN — AMIODARONE HYDROCHLORIDE 400 MILLIGRAM(S): 400 TABLET ORAL at 15:05

## 2018-02-11 RX ADMIN — Medication 25 MILLIGRAM(S): at 09:25

## 2018-02-11 RX ADMIN — AMIODARONE HYDROCHLORIDE 400 MILLIGRAM(S): 400 TABLET ORAL at 06:40

## 2018-02-11 RX ADMIN — CHLORHEXIDINE GLUCONATE 30 MILLILITER(S): 213 SOLUTION TOPICAL at 19:00

## 2018-02-11 RX ADMIN — Medication 20 MILLIEQUIVALENT(S): at 11:35

## 2018-02-11 RX ADMIN — AMIODARONE HYDROCHLORIDE 400 MILLIGRAM(S): 400 TABLET ORAL at 21:56

## 2018-02-11 RX ADMIN — ATORVASTATIN CALCIUM 80 MILLIGRAM(S): 80 TABLET, FILM COATED ORAL at 21:56

## 2018-02-11 NOTE — PROGRESS NOTE ADULT - PROBLEM SELECTOR PLAN 6
F: no IVF indicated. Encourage PO intake   E: replete lytes prn with goal K>4 and Mg >2  N: Diet DASH/TLC F: No IVF indicated. Encourage PO intake   E: Replete lytes prn with goal K>4 and Mg >2  N: Diet DASH/TLC

## 2018-02-11 NOTE — PROGRESS NOTE ADULT - ASSESSMENT
48 yo M with PMHx HTN,HLD, CAD; FHx of cousin with sudden death from MI in his 40s, transferred to [facility] from [facility] after he initially presented s/p cardiopulmonary arrest while playing soccer.  Five years ago the patient had [facility] and was found to have triple vessel disease and refused CT surgery at that time.  He was playing soccer when he lost consciousness for 2-4 minutes. CPR was started on the field, pt was defibrillated twice and ROSC was achieved. He was brought by EMS to the hospital. He was started on amiodarone, admitted to the CCU, started on ASA/Plavix/atorvastatin.   Upon arrival to [facility] on 2/8/18, vitals were stable (T97.5, HR 78, RR 20, SpO2 98%, /77). Labs were unremarkable for FSBG 137, Trop I 0.27, CKMB 3.7, BNP 18, PT/INR/PTT 12.6/1.1/29, BUN/Crt 14/1.03, mild transaminitis AST/ALT 72/113, Alk Phos 79, H/H 14.6/45. Chest X-ray with clear lungs. EKG showed sinus rhythm with 1st degree heart block, Q wave in III, aVF.  The patient underwent left heart catheterization 2/8/18 found 80% stenosis of RCA, 70% osteal stenosis of PDA with sequential lesions of 80-85% stenosis; patent LM; 50% osteal stenosis circumflex, 75% stenosis of mid circumflex, 80% stenosis mid to distal circumflex; 95-99% stenosis dOM1 with; 85% stenosis of pLAD and 70% stenosis dLAD.  LV gram 60% with no wall motion abnormality. The plan was discussed as multiple stents vs CABG and he declined the offer for intervention. He was transferred to [facility] for CABG, admitted to CCU for monitoring. 46 yo M with PMHx HTN,HLD, CAD; FHx of cousin with sudden death from MI in his 40s, transferred to [facility] from [facility] after he initially presented s/p cardiopulmonary arrest while playing soccer.  Five years ago the patient had [facility] and was found to have triple vessel disease and refused CT surgery at that time.  He was playing soccer when he lost consciousness for 2-4 minutes. CPR was started on the field, pt was defibrillated twice and ROSC was achieved. He was brought by EMS to the hospital. He was started on amiodarone, admitted to the CCU, started on ASA/Plavix/atorvastatin.   Upon arrival to [facility] on 2/8/18, vitals were stable. Labs were unremarkable Trop I 0.27, CKMB 3.7, BNP 18, mild transaminitis AST/ALT 72/113, Alk Phos 79, H/H 14.6/45. Chest X-ray with clear lungs. EKG showed sinus rhythm with 1st degree heart block, Q wave in III, aVF. The patient underwent left heart catheterization 2/8/18 found 3 VCAD.  LV gram 60% with no wall motion abnormality. Dr. Nichole was consulted for OPCAB.       -HD stable. On Amio, BB  -PST complete.   -NPO since midnight  -Consent signed and in chart  -T&S ordered for am labs  -Blood on hold for OR  -Preop orders in

## 2018-02-11 NOTE — PROGRESS NOTE ADULT - SUBJECTIVE AND OBJECTIVE BOX
Planned Date of Surgery:       2/12/18                                                                                                           Surgeon: Zachary Nichole    Procedure: OBCAB    HPI:  48 yo M with PMHx HTN,HLD, CAD; FHx of cousin with sudden death from MI in his 40s, transferred to [facility] from [facility] after he initially presented s/p cardiopulmonary arrest while playing soccer.  Five years ago the patient had [facility] and was found to have triple vessel disease and refused CT surgery at that time.  He was playing soccer when he lost consciousness for 2-4 minutes. CPR was started on the field, pt was defibrillated twice and ROSC was achieved. He was brought by EMS to the hospital. He was started on amiodarone, admitted to the CCU, started on ASA/Plavix/atorvastatin.   Upon arrival to [facility] on 2/8/18, vitals were stable (T97.5, HR 78, RR 20, SpO2 98%, /77). Labs were unremarkable for FSBG 137, Trop I 0.27, CKMB 3.7, BNP 18, PT/INR/PTT 12.6/1.1/29, BUN/Crt 14/1.03, mild transaminitis AST/ALT 72/113, Alk Phos 79, H/H 14.6/45. Chest X-ray with clear lungs. EKG showed sinus rhythm with 1st degree heart block, Q wave in III, aVF.  The patient underwent left heart catheterization 2/8/18 found 80% stenosis of RCA, 70% osteal stenosis of PDA with sequential lesions of 80-85% stenosis; patent LM; 50% osteal stenosis circumflex, 75% stenosis of mid circumflex, 80% stenosis mid to distal circumflex; 95-99% stenosis dOM1 with; 85% stenosis of pLAD and 70% stenosis dLAD.  LV gram 60% with no wall motion abnormality. The plan was discussed as multiple stents vs CABG and he declined the offer for intervention. He was transferred to [facility] for CABG, admitted to CCU for monitoring.      PAST MEDICAL & SURGICAL HISTORY:  HLD (hyperlipidemia)  HTN (hypertension)  CAD (coronary artery disease)  H/O umbilical hernia repair  Achilles rupture, left: s/p repair  History of appendectomy      No Known Allergies      MEDICATIONS  (STANDING):  amiodarone    Tablet 400 milliGRAM(s) Oral three times a day  aspirin enteric coated 81 milliGRAM(s) Oral daily  atorvastatin 80 milliGRAM(s) Oral at bedtime  chlorhexidine 0.12% Liquid 10 milliLiter(s) Swish and Spit once  isosorbide   mononitrate ER Tablet (IMDUR) 30 milliGRAM(s) Oral daily  metoprolol     tartrate 25 milliGRAM(s) Oral every 12 hours    MEDICATIONS  (PRN):      On Beta Blocker? YES    Labs:    02-11    140  |  103  |  21  ----------------------------<  120<H>  3.8   |  23  |  0.97    Ca    9.4      11 Feb 2018 06:25  Phos  3.6     02-11  Mg     2.2     02-11      Hemoglobin A1C, Whole Blood: 5.9 % (02-08-18 @ 18:49)        EKG: < from: 12 Lead ECG (02.09.18 @ 04:43) >  Diagnosis Line Normal sinus rhythm  Inferior infarct , age undetermined    < end of copied text >      CXR: < from: Xray Chest 1 View-PORTABLE IMMEDIATE (02.08.18 @ 19:02) >  IMPRESSION: Clear lungs.    < end of copied text >      CT Scans: < from: CT Head No Cont (02.11.18 @ 11:11) >  IMPRESSION: No intracranial hemorrhage or CT findings to suggest anoxic   injury.    < end of copied text >      Cath Report: per HPI    Echo: < from: Echocardiogram (02.09.18 @ 13:32) >  The left atrium is mildly dilated. Right atrial size is normal.There is   mild   aortic valve thickening. No aortic regurgitation noted. No   hemodynamically   significant valvular aortic stenosis.  Structurally normal mitral valve.   There   is trace mitral regurgitation.Structurally normal tricuspid valve. There   was   insufficient TR detected from which to calculate pulmonary artery   systolic   pressure.  The pulmonic valve is not well visualized. The right   ventricleis   borderline dilated. The right ventricular systolic function is normal.    Normal   left ventricular size and wall thickness. The left ventricular wall   motion is   normal. The left ventricular ejection fraction is 63%.  No aortic root   dilatation.There is no pericardial effusion.No prior study for   comparison.    < end of copied text >      PFT's:    Carotid Duplex: < from: US Duplex Carotid Arteries Complete, Bilateral (02.08.18 @ 21:03) >  IMPRESSION:    1.  No hemodynamically significant carotid artery stenosis bilaterally.  2.  Mild calcified plaque is visualized within the bilateral internal   carotid arteries.    < end of copied text >      Consult in Chart?  YES   Consent in Chart? YES  Pre-op Orders Placed? YES   Blood Prodeucts Ordered? YES  NPO ordered? YES

## 2018-02-11 NOTE — PROGRESS NOTE ADULT - ASSESSMENT
46 yo M with PMHx HTN,HLD, CAD; FHx of cousin with sudden death from MI in his 40s, transferred to [facility] from [facility] after he initially presented s/p cardiopulmonary arrest while playing soccer. Left heart catheterization 2/8/18 found multiple vessel CAD. He was transferred to [facility] for CABG, admitted to CCU for monitoring overnight prior to surgery. 46 yo M with PMHx HTN,HLD, CAD; FHx of cousin with sudden death from MI in his 40s, transferred to [facility] from [facility] after he initially presented s/p cardiopulmonary arrest while playing soccer. Left heart catheterization 2/8/18 found multiple vessel CAD. He was transferred to [facility] for CABG, admitted to CCU for monitoring prior to surgery.

## 2018-02-11 NOTE — PROGRESS NOTE ADULT - PROBLEM SELECTOR PLAN 2
left heart catheterization 2/8/18 found 80% stenosis of RCA, 70% osteal stenosis of PDA with sequential lesions of 80-85% stenosis; patent LM; 50% osteal stenosis circumflex, 75% stenosis of mid circumflex, 80% stenosis mid to distal circumflex; 95-99% stenosis dOM1 with; 85% stenosis of pLAD and 70% stenosis dLAD.  LV gram 60% with no wall motion abnormality.  -Plan for CABG on Monday left heart catheterization 2/8/18 found 80% stenosis of RCA, 70% osteal stenosis of PDA with sequential lesions of 80-85% stenosis; patent LM; 50% osteal stenosis circumflex, 75% stenosis of mid circumflex, 80% stenosis mid to distal circumflex; 95-99% stenosis dOM1 with; 85% stenosis of pLAD and 70% stenosis dLAD.  LV gram 60% with no wall motion abnormality.  -Plan for CABG on Monday, NPO at midnight

## 2018-02-11 NOTE — PROGRESS NOTE ADULT - SUBJECTIVE AND OBJECTIVE BOX
O/N Events:  No acute events overnight  Subjective:  Patient seen and examined at bedside-denies any acute complaints, denies chest pain, shortness of breath, dizziness, or palpitations.    VITALS  Vital Signs Last 24 Hrs  T(C): 37.1 (11 Feb 2018 09:09), Max: 37.2 (10 Feb 2018 16:34)  T(F): 98.7 (11 Feb 2018 09:09), Max: 98.9 (10 Feb 2018 16:34)  HR: 58 (11 Feb 2018 10:00) (54 - 80)  BP: 141/88 (11 Feb 2018 10:00) (103/61 - 166/104)  BP(mean): 104 (11 Feb 2018 10:00) (75 - 122)  RR: 22 (11 Feb 2018 10:00) (0 - 41)  SpO2: 96% (11 Feb 2018 10:00) (94% - 100%)    I&O's Summary    10 Feb 2018 07:01  -  11 Feb 2018 07:00  --------------------------------------------------------  IN: 240 mL / OUT: 0 mL / NET: 240 mL        CAPILLARY BLOOD GLUCOSE          PHYSICAL EXAM  General: A&Ox 3; Young male laying in bed in NAD  Head: NC/AT;   Eyes: PERRL; EOMI; anicteric sclera  Neck: Supple; no JVD  Respiratory: CTA B/L; no wheezes/crackles/rales auscultated w/ good air movement  Cardiovascular: Regular rhythm/rate; S1/S2; no gallops or murmurs auscultated  Gastrointestinal: Soft; NTND w/out rebound tenderness or guarding; bowel sounds normal  Extremities: WWP; no edema or cyanosis; radial/pedal pulses palpable  Neurological:  CNII-XII grossly intact; no obvious focal deficits    MEDICATIONS  (STANDING):  amiodarone    Tablet 400 milliGRAM(s) Oral three times a day  aspirin enteric coated 81 milliGRAM(s) Oral daily  atorvastatin 80 milliGRAM(s) Oral at bedtime  chlorhexidine 0.12% Liquid 10 milliLiter(s) Swish and Spit once  isosorbide   mononitrate ER Tablet (IMDUR) 30 milliGRAM(s) Oral daily  metoprolol     tartrate 25 milliGRAM(s) Oral every 12 hours  potassium chloride    Tablet ER 20 milliEquivalent(s) Oral once    MEDICATIONS  (PRN):      LABS    02-11    140  |  103  |  21  ----------------------------<  120<H>  3.8   |  23  |  0.97    Ca    9.4      11 Feb 2018 06:25  Phos  3.6     02-11  Mg     2.2     02-11

## 2018-02-11 NOTE — PROGRESS NOTE ADULT - PROBLEM SELECTOR PLAN 1
MALIK Score 3 (known CAD, >3 rsik factors for CAD, uses of ASA during the past 7 days), 13% risk at 14 days of all cause mortality. Pt with cardiac arrest lasting 2-4 min. S/p CPR and defibrillation with ROSC. S/p amiodarone 450 mg 16 cc/hr (0.5 mg /min x 18hrs) started at 9:28 AM on 2/18, stopped at 3:00 AM on 2/9; s/p ASA 81 mg /Plavix/Atorvastatin 80mg/Metoprolol 50 mg Q12  -Likely 2/2 multivessel CAD  -EKG 2/8 from [place] with sinus rhythm with 1st degree heart block, Q wave in III, aVF.  -EKG on arrival with normal sinus rhythm, Q wave and T wave inversion in lead III, Q wave in aVF   -Episodes of multiple ventricular ectopy beats and non sustained VT during cardiac cath  -c/w amiodarone 400 mg PO TID  -c/w atorvastatin 80 mg PO daily, pt with mild transaminitis AST/ALT 42/86 likely 2/2 to cardiac arrest   -c/w metoprolol 50 mg PO BID  -c/w ASA 81 mg daily  -Holding on starting Lisinopril 10 mg daily given pending surgery  -Trop T positive 0.02-->0.01, peaked. Unlikely NSTEMI as trop I x3 initially negative in AM on 2/8 at 0.05, 0.02 and 0.27 at 9:00AM. Possibility elevated du to cardiac cath  -ECHO 2/9 with LVEF 63; mildly dilated LA; borderline dilate RV; trace MR MALIK Score 3 (known CAD, >3 rsik factors for CAD, uses of ASA during the past 7 days), 13% risk at 14 days of all cause mortality. Pt with cardiac arrest lasting 2-4 min. S/p CPR and defibrillation with ROSC. S/p amiodarone 450 mg 16 cc/hr (0.5 mg /min x 18hrs) started at 9:28 AM on 2/18, stopped at 3:00 AM on 2/9; s/p ASA 81 mg /Plavix/Atorvastatin 80mg/Metoprolol 50 mg Q12  -Likely 2/2 multivessel CAD  -EKG 2/8 from [place] with sinus rhythm with 1st degree heart block, Q wave in III, aVF.  -EKG on arrival with normal sinus rhythm, Q wave and T wave inversion in lead III, Q wave in aVF   -Episodes of multiple ventricular ectopy beats and non sustained VT during cardiac cath  -C/w amiodarone 400 mg PO TID  -C/w atorvastatin 80 mg PO daily, pt with mild transaminitis AST/ALT 42/86 likely 2/2 to cardiac arrest   -C/w metoprolol 25 mg PO q12  -C/w ASA 81 mg daily  -Holding on starting Lisinopril 10 mg daily given pending surgery  -Trop T positive 0.02-->0.01, peaked. Unlikely NSTEMI as trop I x3 initially negative in AM on 2/8 at 0.05, 0.02 and 0.27 at 9:00AM. Possibility elevated due to cardiac catheterization  -ECHO 2/9 with LVEF 63; mildly dilated LA; borderline dilate RV; trace MR

## 2018-02-11 NOTE — PROGRESS NOTE ADULT - PROBLEM SELECTOR PLAN 3
-c/w metoprolol 50 mg BID  -Added Imdur 30 mg daily for better BP control -C/w metoprolol 25 mg q12  -Imdur 30mg daily for better BP control

## 2018-02-12 ENCOUNTER — APPOINTMENT (OUTPATIENT)
Dept: CARDIOTHORACIC SURGERY | Facility: HOSPITAL | Age: 48
End: 2018-02-12
Payer: COMMERCIAL

## 2018-02-12 DIAGNOSIS — Z09 ENCOUNTER FOR FOLLOW-UP EXAMINATION AFTER COMPLETED TREATMENT FOR CONDITIONS OTHER THAN MALIGNANT NEOPLASM: ICD-10-CM

## 2018-02-12 LAB
ALBUMIN SERPL ELPH-MCNC: 4.6 G/DL — SIGNIFICANT CHANGE UP (ref 3.3–5)
ALP SERPL-CCNC: 90 U/L — SIGNIFICANT CHANGE UP (ref 40–120)
ALT FLD-CCNC: 75 U/L — HIGH (ref 10–45)
ANION GAP SERPL CALC-SCNC: 12 MMOL/L — SIGNIFICANT CHANGE UP (ref 5–17)
ANION GAP SERPL CALC-SCNC: 13 MMOL/L — SIGNIFICANT CHANGE UP (ref 5–17)
APTT BLD: 29 SEC — SIGNIFICANT CHANGE UP (ref 27.5–37.4)
APTT BLD: 38.1 SEC — HIGH (ref 27.5–37.4)
AST SERPL-CCNC: 27 U/L — SIGNIFICANT CHANGE UP (ref 10–40)
BASE EXCESS BLDA CALC-SCNC: -2.4 MMOL/L — LOW (ref -2–3)
BASOPHILS NFR BLD AUTO: 0.1 % — SIGNIFICANT CHANGE UP (ref 0–2)
BILIRUB SERPL-MCNC: 0.6 MG/DL — SIGNIFICANT CHANGE UP (ref 0.2–1.2)
BLD GP AB SCN SERPL QL: NEGATIVE — SIGNIFICANT CHANGE UP
BUN SERPL-MCNC: 19 MG/DL — SIGNIFICANT CHANGE UP (ref 7–23)
BUN SERPL-MCNC: 20 MG/DL — SIGNIFICANT CHANGE UP (ref 7–23)
CALCIUM SERPL-MCNC: 8.6 MG/DL — SIGNIFICANT CHANGE UP (ref 8.4–10.5)
CALCIUM SERPL-MCNC: 9.6 MG/DL — SIGNIFICANT CHANGE UP (ref 8.4–10.5)
CHLORIDE SERPL-SCNC: 100 MMOL/L — SIGNIFICANT CHANGE UP (ref 96–108)
CHLORIDE SERPL-SCNC: 105 MMOL/L — SIGNIFICANT CHANGE UP (ref 96–108)
CO2 SERPL-SCNC: 23 MMOL/L — SIGNIFICANT CHANGE UP (ref 22–31)
CO2 SERPL-SCNC: 27 MMOL/L — SIGNIFICANT CHANGE UP (ref 22–31)
CREAT SERPL-MCNC: 0.93 MG/DL — SIGNIFICANT CHANGE UP (ref 0.5–1.3)
CREAT SERPL-MCNC: 1.04 MG/DL — SIGNIFICANT CHANGE UP (ref 0.5–1.3)
EOSINOPHIL NFR BLD AUTO: 0.3 % — SIGNIFICANT CHANGE UP (ref 0–6)
GAS PNL BLDA: SIGNIFICANT CHANGE UP
GLUCOSE BLDC GLUCOMTR-MCNC: 140 MG/DL — HIGH (ref 70–99)
GLUCOSE BLDC GLUCOMTR-MCNC: 156 MG/DL — HIGH (ref 70–99)
GLUCOSE SERPL-MCNC: 126 MG/DL — HIGH (ref 70–99)
GLUCOSE SERPL-MCNC: 163 MG/DL — HIGH (ref 70–99)
HCO3 BLDA-SCNC: 22 MMOL/L — SIGNIFICANT CHANGE UP (ref 21–28)
HCT VFR BLD CALC: 40.2 % — SIGNIFICANT CHANGE UP (ref 39–50)
HCT VFR BLD CALC: 44.7 % — SIGNIFICANT CHANGE UP (ref 39–50)
HGB BLD-MCNC: 13.4 G/DL — SIGNIFICANT CHANGE UP (ref 13–17)
HGB BLD-MCNC: 15.3 G/DL — SIGNIFICANT CHANGE UP (ref 13–17)
INR BLD: 1.09 — SIGNIFICANT CHANGE UP (ref 0.88–1.16)
INR BLD: 1.41 — HIGH (ref 0.88–1.16)
LYMPHOCYTES # BLD AUTO: 9.5 % — LOW (ref 13–44)
MAGNESIUM SERPL-MCNC: 1.5 MG/DL — LOW (ref 1.6–2.6)
MAGNESIUM SERPL-MCNC: 2.2 MG/DL — SIGNIFICANT CHANGE UP (ref 1.6–2.6)
MCHC RBC-ENTMCNC: 28.8 PG — SIGNIFICANT CHANGE UP (ref 27–34)
MCHC RBC-ENTMCNC: 30 PG — SIGNIFICANT CHANGE UP (ref 27–34)
MCHC RBC-ENTMCNC: 33.3 G/DL — SIGNIFICANT CHANGE UP (ref 32–36)
MCHC RBC-ENTMCNC: 34.2 G/DL — SIGNIFICANT CHANGE UP (ref 32–36)
MCV RBC AUTO: 86.5 FL — SIGNIFICANT CHANGE UP (ref 80–100)
MCV RBC AUTO: 87.6 FL — SIGNIFICANT CHANGE UP (ref 80–100)
MONOCYTES NFR BLD AUTO: 8.2 % — SIGNIFICANT CHANGE UP (ref 2–14)
NEUTROPHILS NFR BLD AUTO: 81.9 % — HIGH (ref 43–77)
PCO2 BLDA: 38 MMHG — SIGNIFICANT CHANGE UP (ref 35–48)
PH BLDA: 7.39 — SIGNIFICANT CHANGE UP (ref 7.35–7.45)
PHOSPHATE SERPL-MCNC: 3.5 MG/DL — SIGNIFICANT CHANGE UP (ref 2.5–4.5)
PHOSPHATE SERPL-MCNC: 3.9 MG/DL — SIGNIFICANT CHANGE UP (ref 2.5–4.5)
PLATELET # BLD AUTO: 226 K/UL — SIGNIFICANT CHANGE UP (ref 150–400)
PLATELET # BLD AUTO: 273 K/UL — SIGNIFICANT CHANGE UP (ref 150–400)
PO2 BLDA: 135 MMHG — HIGH (ref 83–108)
POTASSIUM SERPL-MCNC: 3.9 MMOL/L — SIGNIFICANT CHANGE UP (ref 3.5–5.3)
POTASSIUM SERPL-MCNC: 4 MMOL/L — SIGNIFICANT CHANGE UP (ref 3.5–5.3)
POTASSIUM SERPL-SCNC: 3.9 MMOL/L — SIGNIFICANT CHANGE UP (ref 3.5–5.3)
POTASSIUM SERPL-SCNC: 4 MMOL/L — SIGNIFICANT CHANGE UP (ref 3.5–5.3)
PROT SERPL-MCNC: 8.2 G/DL — SIGNIFICANT CHANGE UP (ref 6–8.3)
PROTHROM AB SERPL-ACNC: 12.1 SEC — SIGNIFICANT CHANGE UP (ref 9.8–12.7)
PROTHROM AB SERPL-ACNC: 15.8 SEC — HIGH (ref 9.8–12.7)
RBC # BLD: 4.65 M/UL — SIGNIFICANT CHANGE UP (ref 4.2–5.8)
RBC # BLD: 5.1 M/UL — SIGNIFICANT CHANGE UP (ref 4.2–5.8)
RBC # FLD: 12.9 % — SIGNIFICANT CHANGE UP (ref 10.3–16.9)
RBC # FLD: 13.3 % — SIGNIFICANT CHANGE UP (ref 10.3–16.9)
RH IG SCN BLD-IMP: POSITIVE — SIGNIFICANT CHANGE UP
SAO2 % BLDA: 99 % — SIGNIFICANT CHANGE UP (ref 95–100)
SODIUM SERPL-SCNC: 140 MMOL/L — SIGNIFICANT CHANGE UP (ref 135–145)
SODIUM SERPL-SCNC: 140 MMOL/L — SIGNIFICANT CHANGE UP (ref 135–145)
WBC # BLD: 16.2 K/UL — HIGH (ref 3.8–10.5)
WBC # BLD: 8.1 K/UL — SIGNIFICANT CHANGE UP (ref 3.8–10.5)
WBC # FLD AUTO: 16.2 K/UL — HIGH (ref 3.8–10.5)
WBC # FLD AUTO: 8.1 K/UL — SIGNIFICANT CHANGE UP (ref 3.8–10.5)

## 2018-02-12 PROCEDURE — 33535 CABG ARTERIAL THREE: CPT | Mod: AS

## 2018-02-12 PROCEDURE — 93010 ELECTROCARDIOGRAM REPORT: CPT | Mod: 77

## 2018-02-12 PROCEDURE — 33535 CABG ARTERIAL THREE: CPT | Mod: 80

## 2018-02-12 PROCEDURE — 33535 CABG ARTERIAL THREE: CPT

## 2018-02-12 PROCEDURE — 71045 X-RAY EXAM CHEST 1 VIEW: CPT | Mod: 26,76

## 2018-02-12 PROCEDURE — 76998 US GUIDE INTRAOP: CPT | Mod: 26,AS

## 2018-02-12 PROCEDURE — 93010 ELECTROCARDIOGRAM REPORT: CPT

## 2018-02-12 PROCEDURE — 99233 SBSQ HOSP IP/OBS HIGH 50: CPT

## 2018-02-12 PROCEDURE — 76998 US GUIDE INTRAOP: CPT | Mod: 26,59

## 2018-02-12 PROCEDURE — 99291 CRITICAL CARE FIRST HOUR: CPT

## 2018-02-12 RX ORDER — MAGNESIUM SULFATE 500 MG/ML
2 VIAL (ML) INJECTION ONCE
Qty: 0 | Refills: 0 | Status: COMPLETED | OUTPATIENT
Start: 2018-02-12 | End: 2018-02-12

## 2018-02-12 RX ORDER — PROPOFOL 10 MG/ML
15 INJECTION, EMULSION INTRAVENOUS
Qty: 1000 | Refills: 0 | Status: DISCONTINUED | OUTPATIENT
Start: 2018-02-12 | End: 2018-02-13

## 2018-02-12 RX ORDER — PANTOPRAZOLE SODIUM 20 MG/1
40 TABLET, DELAYED RELEASE ORAL DAILY
Qty: 0 | Refills: 0 | Status: DISCONTINUED | OUTPATIENT
Start: 2018-02-12 | End: 2018-02-13

## 2018-02-12 RX ORDER — ASPIRIN/CALCIUM CARB/MAGNESIUM 324 MG
81 TABLET ORAL DAILY
Qty: 0 | Refills: 0 | Status: DISCONTINUED | OUTPATIENT
Start: 2018-02-12 | End: 2018-02-15

## 2018-02-12 RX ORDER — DEXTROSE 50 % IN WATER 50 %
25 SYRINGE (ML) INTRAVENOUS
Qty: 0 | Refills: 0 | Status: DISCONTINUED | OUTPATIENT
Start: 2018-02-12 | End: 2018-02-15

## 2018-02-12 RX ORDER — MEPERIDINE HYDROCHLORIDE 50 MG/ML
25 INJECTION INTRAMUSCULAR; INTRAVENOUS; SUBCUTANEOUS ONCE
Qty: 0 | Refills: 0 | Status: DISCONTINUED | OUTPATIENT
Start: 2018-02-12 | End: 2018-02-13

## 2018-02-12 RX ORDER — ALBUMIN HUMAN 25 %
250 VIAL (ML) INTRAVENOUS
Qty: 0 | Refills: 0 | Status: COMPLETED | OUTPATIENT
Start: 2018-02-12 | End: 2018-02-12

## 2018-02-12 RX ORDER — CLOPIDOGREL BISULFATE 75 MG/1
75 TABLET, FILM COATED ORAL DAILY
Qty: 0 | Refills: 0 | Status: DISCONTINUED | OUTPATIENT
Start: 2018-02-12 | End: 2018-02-14

## 2018-02-12 RX ORDER — SODIUM CHLORIDE 9 MG/ML
1000 INJECTION, SOLUTION INTRAVENOUS
Qty: 0 | Refills: 0 | Status: DISCONTINUED | OUTPATIENT
Start: 2018-02-12 | End: 2018-02-15

## 2018-02-12 RX ORDER — POTASSIUM CHLORIDE 20 MEQ
20 PACKET (EA) ORAL ONCE
Qty: 0 | Refills: 0 | Status: COMPLETED | OUTPATIENT
Start: 2018-02-12 | End: 2018-02-12

## 2018-02-12 RX ORDER — CEFAZOLIN SODIUM 1 G
2000 VIAL (EA) INJECTION EVERY 8 HOURS
Qty: 0 | Refills: 0 | Status: COMPLETED | OUTPATIENT
Start: 2018-02-12 | End: 2018-02-14

## 2018-02-12 RX ORDER — INSULIN HUMAN 100 [IU]/ML
1 INJECTION, SOLUTION SUBCUTANEOUS
Qty: 100 | Refills: 0 | Status: DISCONTINUED | OUTPATIENT
Start: 2018-02-12 | End: 2018-02-13

## 2018-02-12 RX ORDER — DEXTROSE 50 % IN WATER 50 %
50 SYRINGE (ML) INTRAVENOUS
Qty: 0 | Refills: 0 | Status: DISCONTINUED | OUTPATIENT
Start: 2018-02-12 | End: 2018-02-15

## 2018-02-12 RX ORDER — NOREPINEPHRINE BITARTRATE/D5W 8 MG/250ML
0.02 PLASTIC BAG, INJECTION (ML) INTRAVENOUS
Qty: 8 | Refills: 0 | Status: DISCONTINUED | OUTPATIENT
Start: 2018-02-12 | End: 2018-02-13

## 2018-02-12 RX ORDER — NICARDIPINE HYDROCHLORIDE 30 MG/1
2.5 CAPSULE, EXTENDED RELEASE ORAL
Qty: 40 | Refills: 0 | Status: DISCONTINUED | OUTPATIENT
Start: 2018-02-12 | End: 2018-02-13

## 2018-02-12 RX ORDER — HEPARIN SODIUM 5000 [USP'U]/ML
5000 INJECTION INTRAVENOUS; SUBCUTANEOUS EVERY 8 HOURS
Qty: 0 | Refills: 0 | Status: DISCONTINUED | OUTPATIENT
Start: 2018-02-12 | End: 2018-02-15

## 2018-02-12 RX ADMIN — Medication 125 MILLILITER(S): at 22:00

## 2018-02-12 RX ADMIN — Medication 50 GRAM(S): at 22:00

## 2018-02-12 RX ADMIN — CHLORHEXIDINE GLUCONATE 1 APPLICATION(S): 213 SOLUTION TOPICAL at 05:00

## 2018-02-12 RX ADMIN — PROPOFOL 7.91 MICROGRAM(S)/KG/MIN: 10 INJECTION, EMULSION INTRAVENOUS at 20:30

## 2018-02-12 RX ADMIN — Medication 20 MILLIEQUIVALENT(S): at 10:24

## 2018-02-12 RX ADMIN — Medication 50 GRAM(S): at 20:55

## 2018-02-12 RX ADMIN — SODIUM CHLORIDE 10 MILLILITER(S): 9 INJECTION, SOLUTION INTRAVENOUS at 21:27

## 2018-02-12 RX ADMIN — Medication 125 MILLILITER(S): at 22:30

## 2018-02-12 RX ADMIN — Medication 100 MILLIGRAM(S): at 22:22

## 2018-02-12 NOTE — BRIEF OPERATIVE NOTE - POST-OP DX
Coronary artery disease involving native coronary artery with other forms of angina pectoris, unspecified whether native or transplanted heart  02/12/2018    Active  Steffanie Gabriel

## 2018-02-12 NOTE — PROGRESS NOTE ADULT - NSHPATTENDINGPLANDISCUSS_GEN_ALL_CORE
ICU staff and family.
the patient, his fiance, Dr. Monge, Dr. Nichole and ccu care team
housestaff and patient.

## 2018-02-12 NOTE — PROGRESS NOTE ADULT - PROBLEM SELECTOR PLAN 2
left heart catheterization 2/8/18 found 80% stenosis of RCA, 70% osteal stenosis of PDA with sequential lesions of 80-85% stenosis; patent LM; 50% osteal stenosis circumflex, 75% stenosis of mid circumflex, 80% stenosis mid to distal circumflex; 95-99% stenosis dOM1 with; 85% stenosis of pLAD and 70% stenosis dLAD.  LV gram 60% with no wall motion abnormality.  -Plan for CABG on Monday, NPO at midnight

## 2018-02-12 NOTE — PROGRESS NOTE ADULT - ASSESSMENT
48 yo M with PMHx HTN,HLD, CAD; FHx of cousin with sudden death from MI in his 40s, transferred to [facility] from [facility] after he initially presented s/p cardiopulmonary arrest while playing soccer. Left heart catheterization 2/8/18 found multiple vessel CAD. He was transferred to [facility] for CABG, admitted to CCU for monitoring prior to surgery.

## 2018-02-12 NOTE — PROGRESS NOTE ADULT - PROBLEM SELECTOR PLAN 1
MALIK Score 3 (known CAD, >3 rsik factors for CAD, uses of ASA during the past 7 days), 13% risk at 14 days of all cause mortality. Pt with cardiac arrest lasting 2-4 min. S/p CPR and defibrillation with ROSC. S/p amiodarone 450 mg 16 cc/hr (0.5 mg /min x 18hrs) started at 9:28 AM on 2/18, stopped at 3:00 AM on 2/9; s/p ASA 81 mg /Plavix/Atorvastatin 80mg/Metoprolol 50 mg Q12  -Likely 2/2 multivessel CAD  -EKG 2/8 from [place] with sinus rhythm with 1st degree heart block, Q wave in III, aVF.  -EKG on arrival with normal sinus rhythm, Q wave and T wave inversion in lead III, Q wave in aVF   -Episodes of multiple ventricular ectopy beats and non sustained VT during cardiac cath  -C/w amiodarone 400 mg PO TID  -C/w atorvastatin 80 mg PO daily, pt with mild transaminitis AST/ALT 42/86 likely 2/2 to cardiac arrest   -C/w metoprolol 25 mg PO q12  -C/w ASA 81 mg daily  -Holding on starting Lisinopril 10 mg daily given pending surgery  -Trop T positive 0.02-->0.01, peaked. Unlikely NSTEMI as trop I x3 initially negative in AM on 2/8 at 0.05, 0.02 and 0.27 at 9:00AM. Possibility elevated due to cardiac catheterization  -ECHO 2/9 with LVEF 63; mildly dilated LA; borderline dilate RV; trace MR  -CT head 2/11 with no intracranial hemorrhage or CT findings to suggest anoxic   injury.

## 2018-02-12 NOTE — PROGRESS NOTE ADULT - SUBJECTIVE AND OBJECTIVE BOX
Date of surgery : 02/12/18    Surgeon : Dr. zepeda    Anesthesia :    Procedure :  Off Pump CABG x 3.    Pump Time :      0                                Aortic X -Clamp :            0                                    Circulatory Arrest :0    EF :     Pre OP : 55%                                  Post OP :                                                      Assist Device :    Airway :      Difficult Airway :                   [ ] Yes     [x ] No      ETT             Size :   8                      Lip Line :     21                      Ventilator setting :              [ ] ON          [ ] BS +           [ ] ETCO2               Lines :      [ ] PA catheter      [x ] Radial Arterial Line              [ x ] Right              [  ] Left       [ ] Femoral Arterial Line          [  ] Right              [  ] Left      [x ] Central Line   IJ                     [x  ] Right              [  ] Left      [ ] Femoral Central line            [  ] Right              [  ] Left     Tubes :      Blakes :                                      [  ] Med.             [  ] Pleural      Chest Tubes :                           [ x ] Med.             [ x ] Pleural       Pacing     Wires :             [   ] Atrial                  [x   ]  Venticular      Pacer Setting :                   Threshold  :                Sensitivity :       Intake     Drips :        IVF :     Albumin :     Product :             [   ]  PRBC       [  ] Platelet     [   ] FFP          [   ] Cryoprecipitate                                  [   ]  Cell saver                                  [   ]  Hemostatic agent :                                  [   ]   Factors :       Output      EBL :      Urine :       Antibiotics :   ICU Vital Signs Last 24 Hrs  T(C): 36.8 (02-12-18 @ 19:40), Max: 37.2 (02-11-18 @ 21:17)  T(F): 98.3 (02-12-18 @ 19:40), Max: 98.9 (02-11-18 @ 21:17)  HR: 64 (02-12-18 @ 20:00) (48 - 68)  BP: 156/96 (02-12-18 @ 12:00) (104/61 - 156/96)  BP(mean): 103 (02-12-18 @ 12:00) (74 - 114)  ABP: 120/57 (02-12-18 @ 20:00) (100/44 - 132/44)  ABP(mean): 75 (02-12-18 @ 20:00) (60 - 82)  RR: 11 (02-12-18 @ 20:00) (11 - 37)  SpO2: 100% (02-12-18 @ 20:00) (94% - 100%)    I&O's Summary    11 Feb 2018 07:01  -  12 Feb 2018 07:00  --------------------------------------------------------  IN: 180 mL / OUT: 0 mL / NET: 180 mL    12 Feb 2018 07:01  -  12 Feb 2018 20:31  --------------------------------------------------------  IN: 10 mL / OUT: 110 mL / NET: -100 mL      ABG - ( 12 Feb 2018 19:50 )  pH: 7.39  /  pCO2: 38    /  pO2: 135   / HCO3: 22    / Base Excess: -2.4  /  SaO2: 99                                      13.4   16.2  )-----------( 226      ( 12 Feb 2018 19:45 )             40.2     12 Feb 2018 19:45    140    |  105    |  19     ----------------------------<  163    4.0     |  23     |  0.93     Ca    8.6        12 Feb 2018 19:45  Phos  3.5       12 Feb 2018 19:45  Mg     1.5       12 Feb 2018 19:45    TPro  8.2    /  Alb  4.6    /  TBili  0.6    /  DBili  x      /  AST  27     /  ALT  75     /  AlkPhos  90     12 Feb 2018 06:05    PT/INR - ( 12 Feb 2018 19:45 )   PT: 15.8 sec;   INR: 1.41          PTT - ( 12 Feb 2018 19:45 )  PTT:29.0 sec  MEDICATIONS  (STANDING):  aspirin enteric coated 81 milliGRAM(s) Oral daily  ceFAZolin   IVPB 2000 milliGRAM(s) IV Intermittent every 8 hours  clopidogrel Tablet 75 milliGRAM(s) Oral daily  dextrose 50% Injectable 50 milliLiter(s) IV Push every 15 minutes  dextrose 50% Injectable 25 milliLiter(s) IV Push every 15 minutes  heparin  Injectable 5000 Unit(s) SubCutaneous every 8 hours  insulin Infusion 1 Unit(s)/Hr IV Continuous <Continuous>  magnesium sulfate  IVPB 2 Gram(s) IV Intermittent once  magnesium sulfate  IVPB 2 Gram(s) IV Intermittent once  norepinephrine Infusion 0.02 MICROgram(s)/kG/Min IV Continuous <Continuous>  pantoprazole  Injectable 40 milliGRAM(s) IV Push daily  propofol Infusion 15 MICROgram(s)/kG/Min IV Continuous <Continuous>  sodium chloride 0.45%. 1000 milliLiter(s) IV Continuous <Continuous>      PHYSICAL EXAM:  Gen : no acute distress  Neck: No LAD, No JVD  Respiratory: decreased in the bases  Cardiovascular: S1 and S2, RRR, no M/G/R  Gastrointestinal: BS+, soft, NT/ND  Extremities: No peripheral edema  Vascular: 2+ peripheral pulses  Neurological: A/O x 3, no focal deficits  Incision: clean dry/ no sign of infection  Lines: no sign of infection

## 2018-02-12 NOTE — PROGRESS NOTE ADULT - ASSESSMENT
S/P CABG  TRIPLE arterial conduit Revascularization.  Stable hemodynamics. Fair urine output.  Good oxygenation.  Overall doing well.  Wean to extubate.

## 2018-02-12 NOTE — BRIEF OPERATIVE NOTE - PROCEDURE
<<-----Click on this checkbox to enter Procedure CABG  02/12/2018    Active  MODONOGUDILEEP CABG  02/12/2018    Active  Steffanie Gabriel

## 2018-02-12 NOTE — PROGRESS NOTE ADULT - SUBJECTIVE AND OBJECTIVE BOX
INTERVAL HPI/OVERNIGHT EVENTS:    ROS  CV: Denies chest pain, palpitations  RESP: Denies SOB  GI: Denies abdominal pain, constipation, diarrhea, nausea, vomiting  : Denies dysuria, hematuria, flank or back pain  ID: Denies fevers, chills  MSK: Denies joint pain   DERM: Denies any rashes, bruising, pruritis  NEURO: No headaches, blurry vision, double vision  ENDO: Denies heat or cold intolerances, changes in weight, thinning of hair  PSYCH: Denies any mood changes    VITAL SIGNS:  T(F): 98.3 (02-12-18 @ 05:14), Max: 98.9 (02-11-18 @ 21:17)  HR: 56 (02-12-18 @ 06:30) (48 - 70)  BP: 135/72 (02-12-18 @ 06:30) (104/61 - 155/92)  BP(mean): 93 (02-12-18 @ 06:30) (74 - 117)  RR: 20 (02-12-18 @ 06:30) (12 - 41)  SpO2: 97% (02-12-18 @ 06:30) (94% - 100%)    02-10-18 @ 07:01  -  02-11-18 @ 07:00  --------------------------------------------------------  IN: 240 mL / OUT: 0 mL / NET: 240 mL    02-11-18 @ 07:01  -  02-12-18 @ 06:52  --------------------------------------------------------  IN: 180 mL / OUT: 0 mL / NET: 180 mL        PHYSICAL EXAM:    Constitutional: WDWN, NAD, resting comfortably   Head: NC/AT  Eyes: PERRL, EOMI, anicteric sclera, no mucosal pallor  ENT: no nasal discharge; uvula midline, no oropharyngeal erythema or exudates; MMM  Neck: supple; no JVD or thyromegaly, no lymphadenopathy  Respiratory: CTA B/L; no W/R/R, no retractions  Cardiac: +S1/S2; RRR; no M/R/G  Gastrointestinal: abdomen soft, NT/ND; no rebound or guarding; +BSx4  Back: spine midline, no bony tenderness or step-offs; no CVAT B/L  Extremities: warm; no calf tenderness, no pedal edema, no cyanosis, no clubbing  Musculoskeletal: NROM x4; no joint swelling, tenderness or erythema  Vascular: 2+ radial; DP/PT pulses B/L  Dermatologic: no rash, no petechia   Lymphatic: no submandibular or cervical LAD  Neurologic: AAOx3; CNII-XII grossly intact; 5/5 strength throughout, sensory symmetrically intact in B/L LE   Psychiatric: pleasant and conversive; affect and characteristics of appearance, verbalizations, behaviors are appropriate      MEDICATIONS  (STANDING):  amiodarone    Tablet 400 milliGRAM(s) Oral three times a day  aspirin enteric coated 81 milliGRAM(s) Oral daily  atorvastatin 80 milliGRAM(s) Oral at bedtime  chlorhexidine 0.12% Liquid 10 milliLiter(s) Swish and Spit once  isosorbide   mononitrate ER Tablet (IMDUR) 30 milliGRAM(s) Oral daily  metoprolol     tartrate 25 milliGRAM(s) Oral every 12 hours    MEDICATIONS  (PRN):      Allergies    No Known Allergies    Intolerances        LABS:                        15.3   8.1   )-----------( 273      ( 12 Feb 2018 06:05 )             44.7     02-11    140  |  103  |  21  ----------------------------<  120<H>  3.8   |  23  |  0.97    Ca    9.4      11 Feb 2018 06:25  Phos  3.6     02-11  Mg     2.2     02-11      PT/INR - ( 12 Feb 2018 06:05 )   PT: 12.1 sec;   INR: 1.09          PTT - ( 12 Feb 2018 06:05 )  PTT:38.1 sec      RADIOLOGY & ADDITIONAL TESTS: INTERVAL HPI/OVERNIGHT EVENTS:    ROS  CV: Denies chest pain, palpitations  RESP: Denies SOB  GI: Denies abdominal pain, constipation, diarrhea, nausea, vomiting  : Denies dysuria, hematuria, flank or back pain  ID: Denies fevers, chills  MSK: Denies joint pain   DERM: Denies any rashes, bruising, pruritis  NEURO: No headaches, blurry vision, double vision  ENDO: Denies heat or cold intolerances, changes in weight, thinning of hair  PSYCH: Denies any mood changes    VITAL SIGNS:  T(F): 98.3 (02-12-18 @ 05:14), Max: 98.9 (02-11-18 @ 21:17)  HR: 56 (02-12-18 @ 06:30) (48 - 70)  BP: 135/72 (02-12-18 @ 06:30) (104/61 - 155/92)  BP(mean): 93 (02-12-18 @ 06:30) (74 - 117)  RR: 20 (02-12-18 @ 06:30) (12 - 41)  SpO2: 97% (02-12-18 @ 06:30) (94% - 100%)    02-10-18 @ 07:01  -  02-11-18 @ 07:00  --------------------------------------------------------  IN: 240 mL / OUT: 0 mL / NET: 240 mL    02-11-18 @ 07:01  -  02-12-18 @ 06:52  --------------------------------------------------------  IN: 180 mL / OUT: 0 mL / NET: 180 mL        PHYSICAL EXAM:    Constitutional: WDWN, NAD, resting comfortably   Head: NC/AT  Eyes: PERRL, EOMI, anicteric sclera, no mucosal pallor  ENT: no nasal discharge; uvula midline, no oropharyngeal erythema or exudates; MMM  Neck: supple; no JVD or thyromegaly, no lymphadenopathy  Respiratory: CTA B/L; no W/R/R, no retractions  Cardiac: +S1/S2; RRR; no M/R/G  Gastrointestinal: abdomen soft, NT/ND; no rebound or guarding; +BSx4  Back: spine midline, no bony tenderness or step-offs; no CVAT B/L  Extremities: warm; no calf tenderness, no pedal edema, no cyanosis, no clubbing  Musculoskeletal: NROM x4; no joint swelling, tenderness or erythema  Vascular: 2+ radial; DP/PT pulses B/L  Dermatologic: no rash, no petechia   Lymphatic: no submandibular or cervical LAD  Neurologic: AAOx3; CNII-XII grossly intact; 5/5 strength throughout, sensory symmetrically intact in B/L LE   Psychiatric: pleasant and conversive; affect and characteristics of appearance, verbalizations, behaviors are appropriate      MEDICATIONS  (STANDING):  amiodarone    Tablet 400 milliGRAM(s) Oral three times a day  aspirin enteric coated 81 milliGRAM(s) Oral daily  atorvastatin 80 milliGRAM(s) Oral at bedtime  chlorhexidine 0.12% Liquid 10 milliLiter(s) Swish and Spit once  isosorbide   mononitrate ER Tablet (IMDUR) 30 milliGRAM(s) Oral daily  metoprolol     tartrate 25 milliGRAM(s) Oral every 12 hours    MEDICATIONS  (PRN):      Allergies    No Known Allergies    Intolerances        LABS:                        15.3   8.1   )-----------( 273      ( 12 Feb 2018 06:05 )             44.7     02-11    140  |  103  |  21  ----------------------------<  120<H>  3.8   |  23  |  0.97    Ca    9.4      11 Feb 2018 06:25  Phos  3.6     02-11  Mg     2.2     02-11      PT/INR - ( 12 Feb 2018 06:05 )   PT: 12.1 sec;   INR: 1.09          PTT - ( 12 Feb 2018 06:05 )  PTT:38.1 sec      RADIOLOGY & ADDITIONAL TESTS:    EKG: normal sinus, Q waves in leads II, III, aVF and T wave inversion in III consistent with inferior infarct INTERVAL HPI/OVERNIGHT EVENTS: No acute events     ROS  CV: Denies chest pain, palpitations  RESP: Denies SOB  GI: Denies abdominal pain, constipation, diarrhea, nausea, vomiting  : Denies dysuria, hematuria, flank or back pain  ID: Denies fevers, chills  MSK: Denies joint pain   DERM: Denies any rashes, bruising, pruritis  NEURO: No headaches, blurry vision, double vision    ICU Vital Signs Last 24 Hrs  T(C): 36.8 (12 Feb 2018 05:14), Max: 37.2 (11 Feb 2018 21:17)  T(F): 98.3 (12 Feb 2018 05:14), Max: 98.9 (11 Feb 2018 21:17)  HR: 68 (12 Feb 2018 07:00) (48 - 70)  BP: 148/79 (12 Feb 2018 07:00) (104/61 - 155/92)  BP(mean): 99 (12 Feb 2018 07:00) (74 - 113)  RR: 31 (12 Feb 2018 07:00) (12 - 41)  SpO2: 97% (12 Feb 2018 07:00) (94% - 100%)    02-10-18 @ 07:01  -  02-11-18 @ 07:00  --------------------------------------------------------  IN: 240 mL / OUT: 0 mL / NET: 240 mL    02-11-18 @ 07:01  -  02-12-18 @ 06:52  --------------------------------------------------------  IN: 180 mL / OUT: 0 mL / NET: 180 mL    PHYSICAL EXAM:  Consitutional: WDWN, NAD, resting comfortably   Head: NC/AT  Eyes: PERRL, EOMI, anicteric sclera, no mucosal pallor  ENT: no nasal discharge; uvula midline, no oropharyngeal erythema or exudates; MMM  Neck: supple; no JVD or thyromegaly, no lymphadenopathy  Respiratory: CTA B/L; no W/R/R, no retractions  Cardiac: +S1/S2; RRR; no M/R/G  Gastrointestinal: abdomen soft, NT/ND; no rebound or guarding; +BSx4  Back: spine midline, no bony tenderness or step-offs; no CVAT B/L  Extremities: warm; no calf tenderness, no pedal edema, no cyanosis, no clubbing  Musculoskeletal: NROM x4; no joint swelling, tenderness or erythema  Vascular: 2+ radial; DP/PT pulses B/L  Dermatologic: no rash, no petechia   Lymphatic: no submandibular or cervical LAD  Neurologic: AAOx3; CNII-XII grossly intact; 5/5 strength throughout, sensory symmetrically intact in B/L LE   Psychiatric: pleasant and conversive; affect and characteristics of appearance, verbalizations, behaviors are appropriate      MEDICATIONS  (STANDING):  amiodarone    Tablet 400 milliGRAM(s) Oral three times a day  aspirin enteric coated 81 milliGRAM(s) Oral daily  atorvastatin 80 milliGRAM(s) Oral at bedtime  chlorhexidine 0.12% Liquid 10 milliLiter(s) Swish and Spit once  isosorbide   mononitrate ER Tablet (IMDUR) 30 milliGRAM(s) Oral daily  metoprolol     tartrate 25 milliGRAM(s) Oral every 12 hours    MEDICATIONS  (PRN):      Allergies    No Known Allergies    Intolerances        LABS:                        15.3   8.1   )-----------( 273      ( 12 Feb 2018 06:05 )             44.7     02-11    140  |  103  |  21  ----------------------------<  120<H>  3.8   |  23  |  0.97    Ca    9.4      11 Feb 2018 06:25  Phos  3.6     02-11  Mg     2.2     02-11      PT/INR - ( 12 Feb 2018 06:05 )   PT: 12.1 sec;   INR: 1.09          PTT - ( 12 Feb 2018 06:05 )  PTT:38.1 sec      RADIOLOGY & ADDITIONAL TESTS:    EKG: normal sinus, Q waves in leads II, III, aVF and T wave inversion in III consistent with inferior infarct HOSPITAL COURSE:  48 yo M with PMHx HTN,HLD, CAD; FHx of cousin with sudden death from MI in his 40s, transferred to [facility] from [facility] after he initially presented s/p cardiopulmonary arrest while playing soccer.  Five years ago the patient had [facility] and was found to have triple vessel disease and refused CT surgery at that time.  He was playing soccer when he lost consciousness for 2-4 minutes. CPR was started on the field, pt was defibrillated twice and ROSC was achieved. He was brought by EMS to the hospital. He was started on amiodarone, admitted to the CCU, started on ASA/Plavix/atorvastatin.   Upon arrival to [place] on 2/8/18, vitals were stable (T97.5, HR 78, RR 20, SpO2 98%, /77). Labs were unremarkable for FSBG 137, Trop I 0.27, CKMB 3.7, BNP 18, PT/INR/PTT 12.6/1.1/29, BUN/Crt 14/1.03, mild transaminitis AST/ALT 72/113, Alk Phos 79, H/H 14.6/45. Chest X-ray with clear lungs. EKG showed sinus rhythm with 1st degree heart block, Q wave in III, aVF.  The patient underwent left heart catheterization 2/8/18 found 80% stenosis of RCA, 70% osteal stenosis of PDA with sequential lesions of 80-85% stenosis; patent LM; 50% osteal stenosis circumflex, 75% stenosis of mid circumflex, 80% stenosis mid to distal circumflex; 95-99% stenosis dOM1 with; 85% stenosis of pLAD and 70% stenosis dLAD.  LV gram 60% with no wall motion abnormality. The plan was discussed as multiple stents vs CABG and he declined the offer for intervention. He was transferred to [facility] for CABG, admitted to CCU for monitoring overnight prior to surgery. Upon arrival, pt was hemodynamically stable. He denied any SOB/CP/palpitations/diarrhea/constipation/N/V, fevers/chills. He has had no peripheral edema, orthopnea, PND, no exercise intolerance. He was continued on amiodarone. ECHO 2/9 with LVEF 63; mildly dilated LA; borderline dilate RV; trace MR. CT head 2/11 with no intracranial hemorrhage or CT findings to suggest anoxic injury. He was medically cleared for surgery.     INTERVAL HPI/OVERNIGHT EVENTS: No acute events     ROS  CV: Denies chest pain, palpitations  RESP: Denies SOB  GI: Denies abdominal pain, constipation, diarrhea, nausea, vomiting  : Denies dysuria, hematuria, flank or back pain  ID: Denies fevers, chills  MSK: Denies joint pain   DERM: Denies any rashes, bruising, pruritis  NEURO: No headaches, blurry vision, double vision    ICU Vital Signs Last 24 Hrs  T(C): 36.8 (12 Feb 2018 05:14), Max: 37.2 (11 Feb 2018 21:17)  T(F): 98.3 (12 Feb 2018 05:14), Max: 98.9 (11 Feb 2018 21:17)  HR: 68 (12 Feb 2018 07:00) (48 - 70)  BP: 148/79 (12 Feb 2018 07:00) (104/61 - 155/92)  BP(mean): 99 (12 Feb 2018 07:00) (74 - 113)  RR: 31 (12 Feb 2018 07:00) (12 - 41)  SpO2: 97% (12 Feb 2018 07:00) (94% - 100%)    02-10-18 @ 07:01  -  02-11-18 @ 07:00  --------------------------------------------------------  IN: 240 mL / OUT: 0 mL / NET: 240 mL    02-11-18 @ 07:01  -  02-12-18 @ 06:52  --------------------------------------------------------  IN: 180 mL / OUT: 0 mL / NET: 180 mL    PHYSICAL EXAM:  Consitutional: WDWN, NAD, resting comfortably   Head: NC/AT  Eyes: PERRL, EOMI, anicteric sclera, no mucosal pallor  ENT: no nasal discharge; uvula midline, no oropharyngeal erythema or exudates; MMM  Neck: supple; no JVD or thyromegaly, no lymphadenopathy  Respiratory: CTA B/L; no W/R/R, no retractions  Cardiac: +S1/S2; RRR; no M/R/G  Gastrointestinal: abdomen soft, NT/ND; no rebound or guarding; +BSx4  Back: spine midline, no bony tenderness or step-offs; no CVAT B/L  Extremities: warm; no calf tenderness, no pedal edema, no cyanosis, no clubbing  Musculoskeletal: NROM x4; no joint swelling, tenderness or erythema  Vascular: 2+ radial; DP/PT pulses B/L  Dermatologic: no rash, no petechia   Lymphatic: no submandibular or cervical LAD  Neurologic: AAOx3; CNII-XII grossly intact; 5/5 strength throughout, sensory symmetrically intact in B/L LE   Psychiatric: pleasant and conversive; affect and characteristics of appearance, verbalizations, behaviors are appropriate      MEDICATIONS  (STANDING):  amiodarone    Tablet 400 milliGRAM(s) Oral three times a day  aspirin enteric coated 81 milliGRAM(s) Oral daily  atorvastatin 80 milliGRAM(s) Oral at bedtime  chlorhexidine 0.12% Liquid 10 milliLiter(s) Swish and Spit once  isosorbide   mononitrate ER Tablet (IMDUR) 30 milliGRAM(s) Oral daily  metoprolol     tartrate 25 milliGRAM(s) Oral every 12 hours    MEDICATIONS  (PRN):      Allergies    No Known Allergies    Intolerances        LABS:                        15.3   8.1   )-----------( 273      ( 12 Feb 2018 06:05 )             44.7     02-11    140  |  103  |  21  ----------------------------<  120<H>  3.8   |  23  |  0.97    Ca    9.4      11 Feb 2018 06:25  Phos  3.6     02-11  Mg     2.2     02-11      PT/INR - ( 12 Feb 2018 06:05 )   PT: 12.1 sec;   INR: 1.09          PTT - ( 12 Feb 2018 06:05 )  PTT:38.1 sec      RADIOLOGY & ADDITIONAL TESTS:    EKG: normal sinus, Q waves in leads III, aVF and T wave inversion in III consistent with inferior infarct

## 2018-02-12 NOTE — PROGRESS NOTE ADULT - SUBJECTIVE AND OBJECTIVE BOX
CTICU  CRITICAL  CARE  attending     Hand off received 					   Pertinent clinical, laboratory, radiographic, hemodynamic, echocardiographic, respiratory data, microbiologic data and chart were reviewed and analyzed frequently throughout the course of the day and night  Patient seen and examined with CTS/ SH attending at bedside    46 yo M with PMHx HTN,HLD, CAD; FHx of cousin with sudden death from MI in his 40s, transferred to [facility] from [facility] after he initially presented s/p cardiopulmonary arrest while playing soccer.  Five years ago the patient had [facility] and was found to have triple vessel disease and refused CT surgery at that time.  He was playing soccer when he lost consciousness for 2-4 minutes. CPR was started on the field, pt was defibrillated twice and ROSC was achieved. He was brought by EMS to the hospital. He was started on amiodarone, admitted to the CCU, started on ASA/Plavix/atorvastatin.   Upon arrival to [place] on 18, vitals were stable (T97.5, HR 78, RR 20, SpO2 98%, /77). Labs were unremarkable for FSBG 137, Trop I 0.27, CKMB 3.7, BNP 18, PT/INR/PTT 12.6/1.1/29, BUN/Crt 14/1.03, mild transaminitis AST/ALT 72/113, Alk Phos 79, H/H 14.6/45. Chest X-ray with clear lungs. EKG showed sinus rhythm with 1st degree heart block, Q wave in III, aVF.  The patient underwent left heart catheterization 18 found 80% stenosis of RCA, 70% osteal stenosis of PDA with sequential lesions of 80-85% stenosis; patent LM; 50% osteal stenosis circumflex, 75% stenosis of mid circumflex, 80% stenosis mid to distal circumflex; 95-99% stenosis dOM1 with; 85% stenosis of pLAD and 70% stenosis dLAD.  LV gram 60% with no wall motion abnormality. The plan was discussed as multiple stents vs CABG and he declined the offer for intervention. He was transferred to [facility] for CABG, admitted to CCU for monitoring overnight prior to surgery. Upon arrival, pt was hemodynamically stable. He denied any SOB/CP/palpitations/diarrhea/constipation/N/V, fevers/chills. He has had no peripheral edema, orthopnea, PND, no exercise intolerance          HEALTH ISSUES - PROBLEM Dx:  Pre-diabetes: Pre-diabetes  Need for prophylactic measure: Need for prophylactic measure  Nutrition, metabolism, and development symptoms: Nutrition, metabolism, and development symptoms  HLD (hyperlipidemia): HLD (hyperlipidemia)  HTN (hypertension): HTN (hypertension)  CAD (coronary artery disease): CAD (coronary artery disease)  Cardiac arrest: Cardiac arrest      FAMILY HISTORY:  Family history of heart attack  Family history of diabetes mellitus (Mother)  Family history of hypertension (Father, Mother)  PAST MEDICAL & SURGICAL HISTORY:  HLD (hyperlipidemia)  HTN (hypertension)  CAD (coronary artery disease)  H/O umbilical hernia repair  Achilles rupture, left: s/p repair  History of appendectomy    Patient is a 47y old  Male who presents with a chief complaint of Cardiopulmonary arrest. Transferred for CABG (2018 18:28)      14 system review was unremarkable  acute changes include acute respiratory failure  Vital signs, hemodynamic and respiratory parameters were reviewed from the bedside nursing flow sheet.  ICU Vital Signs Last 24 Hrs  T(C): 36.8 (2018 19:40), Max: 37.2 (2018 21:17)  T(F): 98.3 (2018 19:40), Max: 98.9 (2018 21:17)  HR: 64 (2018 20:00) (48 - 68)  BP: 156/96 (2018 12:00) (104/61 - 156/96)  BP(mean): 103 (2018 12:00) (74 - 114)  ABP: 120/57 (2018 20:00) (100/44 - 132/44)  ABP(mean): 75 (2018 20:00) (60 - 82)  RR: 11 (2018 20:00) (11 - 37)  SpO2: 100% (2018 20:00) (94% - 100%)    Adult Advanced Hemodynamics Last 24 Hrs  CVP(mm Hg): --  CVP(cm H2O): --  CO: --  CI: --  PA: --  PA(mean): --  PCWP: --  SVR: --  SVRI: --  PVR: --  PVRI: --, ABG - ( 2018 19:50 )  pH: 7.39  /  pCO2: 38    /  pO2: 135   / HCO3: 22    / Base Excess: -2.4  /  SaO2: 99                  Intake and output was reviewed and the fluid balance was calculated  Daily Height in cm: 172.72 (2018 03:35)    Daily Weight in k (2018 05:14)  I&O's Summary    2018 07:  -  2018 07:00  --------------------------------------------------------  IN: 180 mL / OUT: 0 mL / NET: 180 mL    2018 07:  -  2018 20:35  --------------------------------------------------------  IN: 10 mL / OUT: 110 mL / NET: -100 mL        All lines and drain sites were assessed  Glycemic trend was reviewed CAPILLARY BLOOD GLUCOSE        NEURO: No acute change in mental status.  CVS: S1, S2, No S3.  RESPIRATORY: Auscultation of the chest reveals equal breath sounds.  Abdomen is soft  Extremities are warm and well perfused.  VASCULAR: + Distal pulses.  Wounds appear clean and unremarkable  Antibiotics are perioperative cefazolin.    labs  CBC Full  -  ( 2018 19:45 )  WBC Count : 16.2 K/uL  Hemoglobin : 13.4 g/dL  Hematocrit : 40.2 %  Platelet Count - Automated : 226 K/uL  Mean Cell Volume : 86.5 fL  Mean Cell Hemoglobin : 28.8 pg  Mean Cell Hemoglobin Concentration : 33.3 g/dL  Auto Neutrophil # : x  Auto Lymphocyte # : x  Auto Monocyte # : x  Auto Eosinophil # : x  Auto Basophil # : x  Auto Neutrophil % : 81.9 %  Auto Lymphocyte % : 9.5 %  Auto Monocyte % : 8.2 %  Auto Eosinophil % : 0.3 %  Auto Basophil % : 0.1 %    12    140  |  105  |  19  ----------------------------<  163<H>  4.0   |  23  |  0.93    Ca    8.6      2018 19:45  Phos  3.5     02-12  Mg     1.5     -12    TPro  8.2  /  Alb  4.6  /  TBili  0.6  /  DBili  x   /  AST  27  /  ALT  75<H>  /  AlkPhos  90  -12    PT/INR - ( 2018 19:45 )   PT: 15.8 sec;   INR: 1.41          PTT - ( 2018 19:45 )  PTT:29.0 sec  The current medications were reviewed   MEDICATIONS  (STANDING):  aspirin enteric coated 81 milliGRAM(s) Oral daily  ceFAZolin   IVPB 2000 milliGRAM(s) IV Intermittent every 8 hours  clopidogrel Tablet 75 milliGRAM(s) Oral daily  dextrose 50% Injectable 50 milliLiter(s) IV Push every 15 minutes  dextrose 50% Injectable 25 milliLiter(s) IV Push every 15 minutes  heparin  Injectable 5000 Unit(s) SubCutaneous every 8 hours  insulin Infusion 1 Unit(s)/Hr (1 mL/Hr) IV Continuous <Continuous>  magnesium sulfate  IVPB 2 Gram(s) IV Intermittent once  magnesium sulfate  IVPB 2 Gram(s) IV Intermittent once  norepinephrine Infusion 0.02 MICROgram(s)/kG/Min (3.296 mL/Hr) IV Continuous <Continuous>  pantoprazole  Injectable 40 milliGRAM(s) IV Push daily  propofol Infusion 15 MICROgram(s)/kG/Min (7.911 mL/Hr) IV Continuous <Continuous>  sodium chloride 0.45%. 1000 milliLiter(s) (10 mL/Hr) IV Continuous <Continuous>    MEDICATIONS  (PRN):  meperidine     Injectable 25 milliGRAM(s) IV Push once PRN For Shivering       PROBLEM LIST/ ASSESSMENT:  HEALTH ISSUES - PROBLEM Dx:  Pre-diabetes: Pre-diabetes  Need for prophylactic measure: Need for prophylactic measure  Nutrition, metabolism, and development symptoms: Nutrition, metabolism, and development symptoms  HLD (hyperlipidemia): HLD (hyperlipidemia)  HTN (hypertension): HTN (hypertension)  CAD (coronary artery disease): CAD (coronary artery disease)  Cardiac arrest: Cardiac arrest        Patient is a 47y old  Male who presents with CAD.  S/P CABG.  Total  arterial Revascularization.  Hemodynamically stable.  Fair urine output.  Good oxygenation.  Overall doing well.          My plan includes :  Close hemodynamic, ventilatory and drain monitoring and management.  Wean to Extubate.  Monitor for arrhythmias and monitor parameters for organ perfusion  Monitor neurologic status  Head of the bed should remain elevated to 45 deg .   Chest PT and IS will be encouraged  Monitor adequacy of oxygenation and ventilation and attempt to wean oxygen  Nutritional goals will be met using po eventually , ensure adequate caloric intake and monitor  the same  Stress ulcer and VTE prophylaxis will be achieved    Glycemic control is satisfactory  Electrolytes have been repleted as necessary and wound care has been carried out. Pain control has been achieved.   Aggressive  physical therapy and early mobility and ambulation goals will be met   The family was updated about the course and plan  CRITICAL CARE TIME SPENT in evaluation and management, reassessments, review and interpretation of labs and x-rays, ventilator and hemodynamic management, formulating a plan and coordinating care: ___60____ MIN.  Time does not include procedural time.  CTICU ATTENDING     					    Bret Rose MD

## 2018-02-13 LAB
ANION GAP SERPL CALC-SCNC: 12 MMOL/L — SIGNIFICANT CHANGE UP (ref 5–17)
ANION GAP SERPL CALC-SCNC: 13 MMOL/L — SIGNIFICANT CHANGE UP (ref 5–17)
ANION GAP SERPL CALC-SCNC: 14 MMOL/L — SIGNIFICANT CHANGE UP (ref 5–17)
APTT BLD: 25.8 SEC — LOW (ref 27.5–37.4)
APTT BLD: 31.7 SEC — SIGNIFICANT CHANGE UP (ref 27.5–37.4)
APTT BLD: 33.1 SEC — SIGNIFICANT CHANGE UP (ref 27.5–37.4)
BASE EXCESS BLDA CALC-SCNC: -0.4 MMOL/L — SIGNIFICANT CHANGE UP (ref -2–3)
BASE EXCESS BLDA CALC-SCNC: -1 MMOL/L — SIGNIFICANT CHANGE UP (ref -2–3)
BASE EXCESS BLDA CALC-SCNC: -1.5 MMOL/L — SIGNIFICANT CHANGE UP (ref -2–3)
BASE EXCESS BLDA CALC-SCNC: -1.7 MMOL/L — SIGNIFICANT CHANGE UP (ref -2–3)
BUN SERPL-MCNC: 19 MG/DL — SIGNIFICANT CHANGE UP (ref 7–23)
BUN SERPL-MCNC: 19 MG/DL — SIGNIFICANT CHANGE UP (ref 7–23)
BUN SERPL-MCNC: 20 MG/DL — SIGNIFICANT CHANGE UP (ref 7–23)
CALCIUM SERPL-MCNC: 8.2 MG/DL — LOW (ref 8.4–10.5)
CALCIUM SERPL-MCNC: 8.4 MG/DL — SIGNIFICANT CHANGE UP (ref 8.4–10.5)
CALCIUM SERPL-MCNC: 8.6 MG/DL — SIGNIFICANT CHANGE UP (ref 8.4–10.5)
CHLORIDE SERPL-SCNC: 101 MMOL/L — SIGNIFICANT CHANGE UP (ref 96–108)
CHLORIDE SERPL-SCNC: 101 MMOL/L — SIGNIFICANT CHANGE UP (ref 96–108)
CHLORIDE SERPL-SCNC: 102 MMOL/L — SIGNIFICANT CHANGE UP (ref 96–108)
CO2 SERPL-SCNC: 21 MMOL/L — LOW (ref 22–31)
CO2 SERPL-SCNC: 22 MMOL/L — SIGNIFICANT CHANGE UP (ref 22–31)
CO2 SERPL-SCNC: 23 MMOL/L — SIGNIFICANT CHANGE UP (ref 22–31)
CREAT SERPL-MCNC: 0.82 MG/DL — SIGNIFICANT CHANGE UP (ref 0.5–1.3)
CREAT SERPL-MCNC: 0.83 MG/DL — SIGNIFICANT CHANGE UP (ref 0.5–1.3)
CREAT SERPL-MCNC: 0.83 MG/DL — SIGNIFICANT CHANGE UP (ref 0.5–1.3)
GAS PNL BLDA: SIGNIFICANT CHANGE UP
GLUCOSE BLDC GLUCOMTR-MCNC: 120 MG/DL — HIGH (ref 70–99)
GLUCOSE BLDC GLUCOMTR-MCNC: 125 MG/DL — HIGH (ref 70–99)
GLUCOSE BLDC GLUCOMTR-MCNC: 127 MG/DL — HIGH (ref 70–99)
GLUCOSE BLDC GLUCOMTR-MCNC: 131 MG/DL — HIGH (ref 70–99)
GLUCOSE BLDC GLUCOMTR-MCNC: 135 MG/DL — HIGH (ref 70–99)
GLUCOSE BLDC GLUCOMTR-MCNC: 135 MG/DL — HIGH (ref 70–99)
GLUCOSE BLDC GLUCOMTR-MCNC: 147 MG/DL — HIGH (ref 70–99)
GLUCOSE BLDC GLUCOMTR-MCNC: 148 MG/DL — HIGH (ref 70–99)
GLUCOSE BLDC GLUCOMTR-MCNC: 150 MG/DL — HIGH (ref 70–99)
GLUCOSE BLDC GLUCOMTR-MCNC: 151 MG/DL — HIGH (ref 70–99)
GLUCOSE BLDC GLUCOMTR-MCNC: 168 MG/DL — HIGH (ref 70–99)
GLUCOSE SERPL-MCNC: 159 MG/DL — HIGH (ref 70–99)
GLUCOSE SERPL-MCNC: 167 MG/DL — HIGH (ref 70–99)
GLUCOSE SERPL-MCNC: 184 MG/DL — HIGH (ref 70–99)
HCO3 BLDA-SCNC: 22 MMOL/L — SIGNIFICANT CHANGE UP (ref 21–28)
HCO3 BLDA-SCNC: 23 MMOL/L — SIGNIFICANT CHANGE UP (ref 21–28)
HCO3 BLDA-SCNC: 23 MMOL/L — SIGNIFICANT CHANGE UP (ref 21–28)
HCO3 BLDA-SCNC: 24 MMOL/L — SIGNIFICANT CHANGE UP (ref 21–28)
HCT VFR BLD CALC: 37.2 % — LOW (ref 39–50)
HCT VFR BLD CALC: 37.3 % — LOW (ref 39–50)
HCT VFR BLD CALC: 38.4 % — LOW (ref 39–50)
HGB BLD-MCNC: 12.6 G/DL — LOW (ref 13–17)
HGB BLD-MCNC: 12.7 G/DL — LOW (ref 13–17)
HGB BLD-MCNC: 13 G/DL — SIGNIFICANT CHANGE UP (ref 13–17)
INR BLD: 1.28 — HIGH (ref 0.88–1.16)
INR BLD: 1.29 — HIGH (ref 0.88–1.16)
INR BLD: 1.46 — HIGH (ref 0.88–1.16)
LACTATE SERPL-SCNC: 1.3 MMOL/L — SIGNIFICANT CHANGE UP (ref 0.5–2)
LACTATE SERPL-SCNC: 1.9 MMOL/L — SIGNIFICANT CHANGE UP (ref 0.5–2)
LACTATE SERPL-SCNC: 2 MMOL/L — SIGNIFICANT CHANGE UP (ref 0.5–2)
MAGNESIUM SERPL-MCNC: 2.4 MG/DL — SIGNIFICANT CHANGE UP (ref 1.6–2.6)
MAGNESIUM SERPL-MCNC: 2.5 MG/DL — SIGNIFICANT CHANGE UP (ref 1.6–2.6)
MAGNESIUM SERPL-MCNC: 2.7 MG/DL — HIGH (ref 1.6–2.6)
MCHC RBC-ENTMCNC: 29.4 PG — SIGNIFICANT CHANGE UP (ref 27–34)
MCHC RBC-ENTMCNC: 29.5 PG — SIGNIFICANT CHANGE UP (ref 27–34)
MCHC RBC-ENTMCNC: 29.7 PG — SIGNIFICANT CHANGE UP (ref 27–34)
MCHC RBC-ENTMCNC: 33.9 G/DL — SIGNIFICANT CHANGE UP (ref 32–36)
MCHC RBC-ENTMCNC: 33.9 G/DL — SIGNIFICANT CHANGE UP (ref 32–36)
MCHC RBC-ENTMCNC: 34 G/DL — SIGNIFICANT CHANGE UP (ref 32–36)
MCV RBC AUTO: 86.7 FL — SIGNIFICANT CHANGE UP (ref 80–100)
MCV RBC AUTO: 87.1 FL — SIGNIFICANT CHANGE UP (ref 80–100)
MCV RBC AUTO: 87.1 FL — SIGNIFICANT CHANGE UP (ref 80–100)
PCO2 BLDA: 33 MMHG — LOW (ref 35–48)
PCO2 BLDA: 36 MMHG — SIGNIFICANT CHANGE UP (ref 35–48)
PCO2 BLDA: 37 MMHG — SIGNIFICANT CHANGE UP (ref 35–48)
PCO2 BLDA: 38 MMHG — SIGNIFICANT CHANGE UP (ref 35–48)
PH BLDA: 7.4 — SIGNIFICANT CHANGE UP (ref 7.35–7.45)
PH BLDA: 7.42 — SIGNIFICANT CHANGE UP (ref 7.35–7.45)
PH BLDA: 7.43 — SIGNIFICANT CHANGE UP (ref 7.35–7.45)
PH BLDA: 7.43 — SIGNIFICANT CHANGE UP (ref 7.35–7.45)
PHOSPHATE SERPL-MCNC: 3.5 MG/DL — SIGNIFICANT CHANGE UP (ref 2.5–4.5)
PHOSPHATE SERPL-MCNC: 3.8 MG/DL — SIGNIFICANT CHANGE UP (ref 2.5–4.5)
PHOSPHATE SERPL-MCNC: 3.9 MG/DL — SIGNIFICANT CHANGE UP (ref 2.5–4.5)
PLATELET # BLD AUTO: 216 K/UL — SIGNIFICANT CHANGE UP (ref 150–400)
PLATELET # BLD AUTO: 219 K/UL — SIGNIFICANT CHANGE UP (ref 150–400)
PLATELET # BLD AUTO: 242 K/UL — SIGNIFICANT CHANGE UP (ref 150–400)
PO2 BLDA: 101 MMHG — SIGNIFICANT CHANGE UP (ref 83–108)
PO2 BLDA: 102 MMHG — SIGNIFICANT CHANGE UP (ref 83–108)
PO2 BLDA: 238 MMHG — HIGH (ref 83–108)
PO2 BLDA: 95 MMHG — SIGNIFICANT CHANGE UP (ref 83–108)
POTASSIUM SERPL-MCNC: 3.9 MMOL/L — SIGNIFICANT CHANGE UP (ref 3.5–5.3)
POTASSIUM SERPL-MCNC: 4 MMOL/L — SIGNIFICANT CHANGE UP (ref 3.5–5.3)
POTASSIUM SERPL-MCNC: 4.7 MMOL/L — SIGNIFICANT CHANGE UP (ref 3.5–5.3)
POTASSIUM SERPL-SCNC: 3.9 MMOL/L — SIGNIFICANT CHANGE UP (ref 3.5–5.3)
POTASSIUM SERPL-SCNC: 4 MMOL/L — SIGNIFICANT CHANGE UP (ref 3.5–5.3)
POTASSIUM SERPL-SCNC: 4.7 MMOL/L — SIGNIFICANT CHANGE UP (ref 3.5–5.3)
PROTHROM AB SERPL-ACNC: 14.3 SEC — HIGH (ref 9.8–12.7)
PROTHROM AB SERPL-ACNC: 14.4 SEC — HIGH (ref 9.8–12.7)
PROTHROM AB SERPL-ACNC: 16.3 SEC — HIGH (ref 9.8–12.7)
RBC # BLD: 4.28 M/UL — SIGNIFICANT CHANGE UP (ref 4.2–5.8)
RBC # BLD: 4.29 M/UL — SIGNIFICANT CHANGE UP (ref 4.2–5.8)
RBC # BLD: 4.41 M/UL — SIGNIFICANT CHANGE UP (ref 4.2–5.8)
RBC # FLD: 13.2 % — SIGNIFICANT CHANGE UP (ref 10.3–16.9)
SAO2 % BLDA: 98 % — SIGNIFICANT CHANGE UP (ref 95–100)
SAO2 % BLDA: 99 % — SIGNIFICANT CHANGE UP (ref 95–100)
SODIUM SERPL-SCNC: 135 MMOL/L — SIGNIFICANT CHANGE UP (ref 135–145)
SODIUM SERPL-SCNC: 136 MMOL/L — SIGNIFICANT CHANGE UP (ref 135–145)
SODIUM SERPL-SCNC: 138 MMOL/L — SIGNIFICANT CHANGE UP (ref 135–145)
WBC # BLD: 10.3 K/UL — SIGNIFICANT CHANGE UP (ref 3.8–10.5)
WBC # BLD: 10.6 K/UL — HIGH (ref 3.8–10.5)
WBC # BLD: 11.6 K/UL — HIGH (ref 3.8–10.5)
WBC # FLD AUTO: 10.3 K/UL — SIGNIFICANT CHANGE UP (ref 3.8–10.5)
WBC # FLD AUTO: 10.6 K/UL — HIGH (ref 3.8–10.5)
WBC # FLD AUTO: 11.6 K/UL — HIGH (ref 3.8–10.5)

## 2018-02-13 PROCEDURE — 71045 X-RAY EXAM CHEST 1 VIEW: CPT | Mod: 26

## 2018-02-13 RX ORDER — GLUCAGON INJECTION, SOLUTION 0.5 MG/.1ML
1 INJECTION, SOLUTION SUBCUTANEOUS ONCE
Qty: 0 | Refills: 0 | Status: DISCONTINUED | OUTPATIENT
Start: 2018-02-13 | End: 2018-02-14

## 2018-02-13 RX ORDER — OXYCODONE AND ACETAMINOPHEN 5; 325 MG/1; MG/1
1 TABLET ORAL EVERY 4 HOURS
Qty: 0 | Refills: 0 | Status: DISCONTINUED | OUTPATIENT
Start: 2018-02-13 | End: 2018-02-15

## 2018-02-13 RX ORDER — SENNA PLUS 8.6 MG/1
2 TABLET ORAL AT BEDTIME
Qty: 0 | Refills: 0 | Status: DISCONTINUED | OUTPATIENT
Start: 2018-02-13 | End: 2018-02-15

## 2018-02-13 RX ORDER — POTASSIUM CHLORIDE 20 MEQ
20 PACKET (EA) ORAL ONCE
Qty: 0 | Refills: 0 | Status: COMPLETED | OUTPATIENT
Start: 2018-02-13 | End: 2018-02-13

## 2018-02-13 RX ORDER — INSULIN LISPRO 100/ML
VIAL (ML) SUBCUTANEOUS
Qty: 0 | Refills: 0 | Status: DISCONTINUED | OUTPATIENT
Start: 2018-02-13 | End: 2018-02-14

## 2018-02-13 RX ORDER — LIDOCAINE 4 G/100G
1 CREAM TOPICAL DAILY
Qty: 0 | Refills: 0 | Status: DISCONTINUED | OUTPATIENT
Start: 2018-02-13 | End: 2018-02-15

## 2018-02-13 RX ORDER — PANTOPRAZOLE SODIUM 20 MG/1
40 TABLET, DELAYED RELEASE ORAL
Qty: 0 | Refills: 0 | Status: DISCONTINUED | OUTPATIENT
Start: 2018-02-13 | End: 2018-02-15

## 2018-02-13 RX ORDER — SODIUM CHLORIDE 9 MG/ML
1000 INJECTION, SOLUTION INTRAVENOUS
Qty: 0 | Refills: 0 | Status: DISCONTINUED | OUTPATIENT
Start: 2018-02-13 | End: 2018-02-14

## 2018-02-13 RX ORDER — OXYCODONE AND ACETAMINOPHEN 5; 325 MG/1; MG/1
2 TABLET ORAL EVERY 6 HOURS
Qty: 0 | Refills: 0 | Status: DISCONTINUED | OUTPATIENT
Start: 2018-02-13 | End: 2018-02-15

## 2018-02-13 RX ORDER — ATORVASTATIN CALCIUM 80 MG/1
40 TABLET, FILM COATED ORAL AT BEDTIME
Qty: 0 | Refills: 0 | Status: DISCONTINUED | OUTPATIENT
Start: 2018-02-13 | End: 2018-02-15

## 2018-02-13 RX ORDER — METOPROLOL TARTRATE 50 MG
12.5 TABLET ORAL EVERY 6 HOURS
Qty: 0 | Refills: 0 | Status: DISCONTINUED | OUTPATIENT
Start: 2018-02-13 | End: 2018-02-15

## 2018-02-13 RX ORDER — DEXTROSE 50 % IN WATER 50 %
1 SYRINGE (ML) INTRAVENOUS ONCE
Qty: 0 | Refills: 0 | Status: DISCONTINUED | OUTPATIENT
Start: 2018-02-13 | End: 2018-02-14

## 2018-02-13 RX ORDER — OXYCODONE AND ACETAMINOPHEN 5; 325 MG/1; MG/1
1 TABLET ORAL ONCE
Qty: 0 | Refills: 0 | Status: DISCONTINUED | OUTPATIENT
Start: 2018-02-13 | End: 2018-02-13

## 2018-02-13 RX ORDER — ALBUMIN HUMAN 25 %
250 VIAL (ML) INTRAVENOUS ONCE
Qty: 0 | Refills: 0 | Status: COMPLETED | OUTPATIENT
Start: 2018-02-13 | End: 2018-02-13

## 2018-02-13 RX ORDER — KETOROLAC TROMETHAMINE 30 MG/ML
15 SYRINGE (ML) INJECTION ONCE
Qty: 0 | Refills: 0 | Status: DISCONTINUED | OUTPATIENT
Start: 2018-02-13 | End: 2018-02-13

## 2018-02-13 RX ORDER — ACETAMINOPHEN 500 MG
1000 TABLET ORAL ONCE
Qty: 0 | Refills: 0 | Status: COMPLETED | OUTPATIENT
Start: 2018-02-13 | End: 2018-02-13

## 2018-02-13 RX ORDER — ACETAMINOPHEN 500 MG
650 TABLET ORAL EVERY 6 HOURS
Qty: 0 | Refills: 0 | Status: DISCONTINUED | OUTPATIENT
Start: 2018-02-13 | End: 2018-02-15

## 2018-02-13 RX ORDER — DOCUSATE SODIUM 100 MG
100 CAPSULE ORAL THREE TIMES A DAY
Qty: 0 | Refills: 0 | Status: DISCONTINUED | OUTPATIENT
Start: 2018-02-13 | End: 2018-02-15

## 2018-02-13 RX ORDER — INSULIN GLARGINE 100 [IU]/ML
10 INJECTION, SOLUTION SUBCUTANEOUS EVERY MORNING
Qty: 0 | Refills: 0 | Status: DISCONTINUED | OUTPATIENT
Start: 2018-02-13 | End: 2018-02-13

## 2018-02-13 RX ORDER — ONDANSETRON 8 MG/1
4 TABLET, FILM COATED ORAL ONCE
Qty: 0 | Refills: 0 | Status: COMPLETED | OUTPATIENT
Start: 2018-02-13 | End: 2018-02-13

## 2018-02-13 RX ADMIN — OXYCODONE AND ACETAMINOPHEN 1 TABLET(S): 5; 325 TABLET ORAL at 19:21

## 2018-02-13 RX ADMIN — Medication 2: at 11:12

## 2018-02-13 RX ADMIN — PANTOPRAZOLE SODIUM 40 MILLIGRAM(S): 20 TABLET, DELAYED RELEASE ORAL at 11:12

## 2018-02-13 RX ADMIN — Medication 100 MILLIGRAM(S): at 13:40

## 2018-02-13 RX ADMIN — OXYCODONE AND ACETAMINOPHEN 1 TABLET(S): 5; 325 TABLET ORAL at 18:39

## 2018-02-13 RX ADMIN — Medication 100 MILLIGRAM(S): at 21:22

## 2018-02-13 RX ADMIN — SENNA PLUS 2 TABLET(S): 8.6 TABLET ORAL at 21:23

## 2018-02-13 RX ADMIN — HEPARIN SODIUM 5000 UNIT(S): 5000 INJECTION INTRAVENOUS; SUBCUTANEOUS at 21:23

## 2018-02-13 RX ADMIN — INSULIN GLARGINE 10 UNIT(S): 100 INJECTION, SOLUTION SUBCUTANEOUS at 07:54

## 2018-02-13 RX ADMIN — HEPARIN SODIUM 5000 UNIT(S): 5000 INJECTION INTRAVENOUS; SUBCUTANEOUS at 07:07

## 2018-02-13 RX ADMIN — PROPOFOL 7.91 MICROGRAM(S)/KG/MIN: 10 INJECTION, EMULSION INTRAVENOUS at 00:29

## 2018-02-13 RX ADMIN — Medication 400 MILLIGRAM(S): at 04:39

## 2018-02-13 RX ADMIN — NICARDIPINE HYDROCHLORIDE 12.5 MG/HR: 30 CAPSULE, EXTENDED RELEASE ORAL at 00:20

## 2018-02-13 RX ADMIN — ATORVASTATIN CALCIUM 40 MILLIGRAM(S): 80 TABLET, FILM COATED ORAL at 21:23

## 2018-02-13 RX ADMIN — Medication 125 MILLILITER(S): at 08:40

## 2018-02-13 RX ADMIN — ONDANSETRON 4 MILLIGRAM(S): 8 TABLET, FILM COATED ORAL at 08:41

## 2018-02-13 RX ADMIN — Medication 15 MILLIGRAM(S): at 07:30

## 2018-02-13 RX ADMIN — Medication 12.5 MILLIGRAM(S): at 13:40

## 2018-02-13 RX ADMIN — Medication 125 MILLILITER(S): at 11:15

## 2018-02-13 RX ADMIN — Medication 100 MILLIGRAM(S): at 06:08

## 2018-02-13 RX ADMIN — CLOPIDOGREL BISULFATE 75 MILLIGRAM(S): 75 TABLET, FILM COATED ORAL at 11:12

## 2018-02-13 RX ADMIN — INSULIN HUMAN 1 UNIT(S)/HR: 100 INJECTION, SOLUTION SUBCUTANEOUS at 00:29

## 2018-02-13 RX ADMIN — OXYCODONE AND ACETAMINOPHEN 1 TABLET(S): 5; 325 TABLET ORAL at 21:00

## 2018-02-13 RX ADMIN — Medication 1000 MILLIGRAM(S): at 05:26

## 2018-02-13 RX ADMIN — Medication 100 MILLIEQUIVALENT(S): at 10:53

## 2018-02-13 RX ADMIN — Medication 81 MILLIGRAM(S): at 11:12

## 2018-02-13 RX ADMIN — OXYCODONE AND ACETAMINOPHEN 1 TABLET(S): 5; 325 TABLET ORAL at 19:59

## 2018-02-13 RX ADMIN — Medication 100 MILLIEQUIVALENT(S): at 00:45

## 2018-02-13 RX ADMIN — Medication 15 MILLIGRAM(S): at 08:41

## 2018-02-13 RX ADMIN — HEPARIN SODIUM 5000 UNIT(S): 5000 INJECTION INTRAVENOUS; SUBCUTANEOUS at 13:40

## 2018-02-13 NOTE — DIETITIAN INITIAL EVALUATION ADULT. - PERTINENT MEDS FT
aspirin, heparin, plavix, alb, zofran, D5, D50, dex gel, glucagon, SSI, lantus, levophed, nicardipine, protonix, propofol

## 2018-02-13 NOTE — DIETITIAN INITIAL EVALUATION ADULT. - OTHER INFO
48y/o M s/p OPCAB. Pt seen asleep in chair on NC. He was extubated early this am. Reports a decreased appetite post-op; minimal intake recalled for breakfast (bites). Denies N/V and mechanical issues. Pain controlled. +flatus, no BM yet post-op. PTA he reports eating well and denies wt changes. Attempted Dash/TLC diet education, but pt falling asleep. Left written material at bedside and will re-attempt edu upon f/u as appropriate.

## 2018-02-13 NOTE — AIRWAY REMOVAL NOTE  ADULT & PEDS - ARTIFICAL AIRWAY REMOVAL COMMENTS
pt extubated per MD Rose, pt tolerated well, able to cough and verbalize needs, placed on Aerosol mask, Pt safe and stable, No respiratory distress.

## 2018-02-13 NOTE — PHYSICAL THERAPY INITIAL EVALUATION ADULT - PERTINENT HX OF CURRENT PROBLEM, REHAB EVAL
47 year old male transferred to [facility] from [facility] after he initially presented s/p cardiopulmonary arrest while playing soccer.

## 2018-02-13 NOTE — PROGRESS NOTE ADULT - SUBJECTIVE AND OBJECTIVE BOX
Transfer from     Operation / Date: 2/12/18 OPCAB (BIMA, Radial) EF nl     SUBJECTIVE ASSESSMENT:  47y Male seen and examined.         Vital Signs Last 24 Hrs  T(C): 36.8 (13 Feb 2018 09:54), Max: 37.3 (13 Feb 2018 01:00)  T(F): 98.2 (13 Feb 2018 09:54), Max: 99.1 (13 Feb 2018 01:00)  HR: 70 (13 Feb 2018 12:00) (62 - 74)  BP: 131/64 (13 Feb 2018 13:00) (112/77 - 131/64)  BP(mean): 81 (13 Feb 2018 13:00) (81 - 91)  RR: 20 (13 Feb 2018 13:00) (11 - 26)  SpO2: 100% (13 Feb 2018 13:00) (94% - 100%)  I&O's Detail    12 Feb 2018 07:01  -  13 Feb 2018 07:00  --------------------------------------------------------  IN:    Albumin 5%  - 250 mL: 500 mL    insulin Infusion: 14 mL    IV PiggyBack: 400 mL    niCARdipine Infusion: 77.5 mL    propofol Infusion: 63.3 mL    sodium chloride 0.45%.: 120 mL  Total IN: 1174.8 mL    OUT:    Chest Tube: 250 mL    Chest Tube: 240 mL    Indwelling Catheter - Urethral: 815 mL  Total OUT: 1305 mL    Total NET: -130.2 mL      13 Feb 2018 07:01  -  13 Feb 2018 13:18  --------------------------------------------------------  IN:    Albumin 5%  - 250 mL: 250 mL    IV PiggyBack: 100 mL    sodium chloride 0.45%.: 40 mL  Total IN: 390 mL    OUT:    Chest Tube: 60 mL    Chest Tube: 70 mL    Indwelling Catheter - Urethral: 120 mL  Total OUT: 250 mL    Total NET: 140 mL          CHEST TUBE:  Yes/No. AIR LEAKS: Yes/No. Suction / H2O SEAL.   MADHU DRAIN:  Yes/No.  EPICARDIAL WIRES: Yes/No.  TIE DOWNS: Yes/No.  TSE: Yes/No.    PHYSICAL EXAM:    General:     Neurological:    Cardiovascular:    Respiratory:    Gastrointestinal:    Extremities:    Vascular:    Incision Sites:    LABS:                        13.0   10.6  )-----------( 242      ( 13 Feb 2018 10:33 )             38.4       COUMADIN:  No    PT/INR - ( 13 Feb 2018 10:33 )   PT: 16.3 sec;   INR: 1.46          PTT - ( 13 Feb 2018 10:33 )  PTT:31.7 sec    02-13    135  |  102  |  19  ----------------------------<  167<H>  4.7   |  21<L>  |  0.83    Ca    8.4      13 Feb 2018 10:33  Phos  3.5     02-13  Mg     2.4     02-13    TPro  8.2  /  Alb  4.6  /  TBili  0.6  /  DBili  x   /  AST  27  /  ALT  75<H>  /  AlkPhos  90  02-12          MEDICATIONS  (STANDING):  albumin human  5% IVPB 250 milliLiter(s) IV Intermittent once  aspirin enteric coated 81 milliGRAM(s) Oral daily  atorvastatin 40 milliGRAM(s) Oral at bedtime  ceFAZolin   IVPB 2000 milliGRAM(s) IV Intermittent every 8 hours  clopidogrel Tablet 75 milliGRAM(s) Oral daily  dextrose 5%. 1000 milliLiter(s) (50 mL/Hr) IV Continuous <Continuous>  dextrose 50% Injectable 50 milliLiter(s) IV Push every 15 minutes  dextrose 50% Injectable 25 milliLiter(s) IV Push every 15 minutes  docusate sodium 100 milliGRAM(s) Oral three times a day  heparin  Injectable 5000 Unit(s) SubCutaneous every 8 hours  insulin lispro (HumaLOG) corrective regimen sliding scale   SubCutaneous Before meals and at bedtime  metoprolol     tartrate 12.5 milliGRAM(s) Oral every 6 hours  pantoprazole    Tablet 40 milliGRAM(s) Oral before breakfast  senna 2 Tablet(s) Oral at bedtime  sodium chloride 0.45%. 1000 milliLiter(s) (10 mL/Hr) IV Continuous <Continuous>    MEDICATIONS  (PRN):  acetaminophen   Tablet. 650 milliGRAM(s) Oral every 6 hours PRN Mild Pain (1 - 3)  dextrose Gel 1 Dose(s) Oral once PRN Blood Glucose LESS THAN 70 milliGRAM(s)/deciliter  glucagon  Injectable 1 milliGRAM(s) IntraMuscular once PRN Glucose LESS THAN 70 milligrams/deciliter  meperidine     Injectable 25 milliGRAM(s) IV Push once PRN For Shivering  oxyCODONE    5 mG/acetaminophen 325 mG 1 Tablet(s) Oral every 4 hours PRN Severe Pain (7 - 10)        RADIOLOGY & ADDITIONAL TESTS:  CXR 2/13/18 pending offical read: s/p chest tube removal, no obvious PTX Transfer from     Operation / Date: 2/12/18 OPCAB (BIMA, Radial) EF nl     SUBJECTIVE ASSESSMENT:  47y Male seen and examined. Patient reports pain at incision but tolerable.  Is ambulating, using IS, pulling about 750cc, tolerating PO diet, no BM, but is passing gas.  Denies fever, chest pain, palpitations, SOB, abdominal pain, n/v/d/c.          Vital Signs Last 24 Hrs  T(C): 36.8 (13 Feb 2018 09:54), Max: 37.3 (13 Feb 2018 01:00)  T(F): 98.2 (13 Feb 2018 09:54), Max: 99.1 (13 Feb 2018 01:00)  HR: 70 (13 Feb 2018 12:00) (62 - 74)  BP: 131/64 (13 Feb 2018 13:00) (112/77 - 131/64)  BP(mean): 81 (13 Feb 2018 13:00) (81 - 91)  RR: 20 (13 Feb 2018 13:00) (11 - 26)  SpO2: 100% (13 Feb 2018 13:00) (94% - 100%)  I&O's Detail    12 Feb 2018 07:01  -  13 Feb 2018 07:00  --------------------------------------------------------  IN:    Albumin 5%  - 250 mL: 500 mL    insulin Infusion: 14 mL    IV PiggyBack: 400 mL    niCARdipine Infusion: 77.5 mL    propofol Infusion: 63.3 mL    sodium chloride 0.45%.: 120 mL  Total IN: 1174.8 mL    OUT:    Chest Tube: 250 mL    Chest Tube: 240 mL    Indwelling Catheter - Urethral: 815 mL  Total OUT: 1305 mL    Total NET: -130.2 mL      13 Feb 2018 07:01  -  13 Feb 2018 13:18  --------------------------------------------------------  IN:    Albumin 5%  - 250 mL: 250 mL    IV PiggyBack: 100 mL    sodium chloride 0.45%.: 40 mL  Total IN: 390 mL    OUT:    Chest Tube: 60 mL    Chest Tube: 70 mL    Indwelling Catheter - Urethral: 120 mL  Total OUT: 250 mL    Total NET: 140 mL          CHEST TUBE:  NO  MADHU DRAIN:  No.  EPICARDIAL WIRES: Yes  TIE DOWNS: Yes  TSE: No.    PHYSICAL EXAM:    General: Sitting comfortably in bed, no acute distress     Neurological: Awake alert oriented, no neuro deficits     Cardiovascular: S1S2, RRR, no murmurs appreciated     Respiratory: decreased b/l bases , no wheeze/rales    Gastrointestinal: +BS, soft nontender nondistended    Extremities: warm and well perfused, no edema, no calf tenderness    Vascular: 2+ radial and DP b/l    Incision Sites: MSI: +mepilex, CDI, no click.     LABS:                        13.0   10.6  )-----------( 242      ( 13 Feb 2018 10:33 )             38.4       COUMADIN:  No    PT/INR - ( 13 Feb 2018 10:33 )   PT: 16.3 sec;   INR: 1.46          PTT - ( 13 Feb 2018 10:33 )  PTT:31.7 sec    02-13    135  |  102  |  19  ----------------------------<  167<H>  4.7   |  21<L>  |  0.83    Ca    8.4      13 Feb 2018 10:33  Phos  3.5     02-13  Mg     2.4     02-13    TPro  8.2  /  Alb  4.6  /  TBili  0.6  /  DBili  x   /  AST  27  /  ALT  75<H>  /  AlkPhos  90  02-12          MEDICATIONS  (STANDING):  albumin human  5% IVPB 250 milliLiter(s) IV Intermittent once  aspirin enteric coated 81 milliGRAM(s) Oral daily  atorvastatin 40 milliGRAM(s) Oral at bedtime  ceFAZolin   IVPB 2000 milliGRAM(s) IV Intermittent every 8 hours  clopidogrel Tablet 75 milliGRAM(s) Oral daily  dextrose 5%. 1000 milliLiter(s) (50 mL/Hr) IV Continuous <Continuous>  dextrose 50% Injectable 50 milliLiter(s) IV Push every 15 minutes  dextrose 50% Injectable 25 milliLiter(s) IV Push every 15 minutes  docusate sodium 100 milliGRAM(s) Oral three times a day  heparin  Injectable 5000 Unit(s) SubCutaneous every 8 hours  insulin lispro (HumaLOG) corrective regimen sliding scale   SubCutaneous Before meals and at bedtime  metoprolol     tartrate 12.5 milliGRAM(s) Oral every 6 hours  pantoprazole    Tablet 40 milliGRAM(s) Oral before breakfast  senna 2 Tablet(s) Oral at bedtime  sodium chloride 0.45%. 1000 milliLiter(s) (10 mL/Hr) IV Continuous <Continuous>    MEDICATIONS  (PRN):  acetaminophen   Tablet. 650 milliGRAM(s) Oral every 6 hours PRN Mild Pain (1 - 3)  dextrose Gel 1 Dose(s) Oral once PRN Blood Glucose LESS THAN 70 milliGRAM(s)/deciliter  glucagon  Injectable 1 milliGRAM(s) IntraMuscular once PRN Glucose LESS THAN 70 milligrams/deciliter  meperidine     Injectable 25 milliGRAM(s) IV Push once PRN For Shivering  oxyCODONE    5 mG/acetaminophen 325 mG 1 Tablet(s) Oral every 4 hours PRN Severe Pain (7 - 10)        RADIOLOGY & ADDITIONAL TESTS:  CXR 2/13/18 pending offical read: s/p chest tube removal, no obvious PTX

## 2018-02-13 NOTE — PROGRESS NOTE ADULT - ASSESSMENT
A/P:  47 year old M PMHx HTN,HLD, CAD; FHx of cousin with sudden death from MI in his 40s, transferred to [facility] from [facility] after he initially presented s/p cardiopulmonary arrest while playing soccer.  Five years ago the patient had [facility] and was found to have triple vessel disease and refused CT surgery at that time.  He was playing soccer when he lost consciousness for 2-4 minutes. CPR was started on the field, pt was defibrillated twice and ROSC was achieved. He was brought by EMS to the hospital. He was started on amiodarone, admitted to the CCU, started on ASA/Plavix/atorvastatin.   The patient underwent left heart catheterization 2/8/18 found 3 VCAD.  LV gram 60% with no wall motion abnormality. Patient transferred to [facility] and admitted to CCU. CT surgery Dr. Nichole consulted and on 2/12/18 patient underwent uncomplicated OPCAB x 3 (BIMA, radial), EF nl.  Patient extubated POD0 extubated. POD1 delined, CT removed f/u CXR stable, sotelo out f/u TOV, and BB started.       Neurovascular: No delirium. Pain well controlled with current regimen.  -cont tylenol/percocet PRN pain    Cardiovascular: Hemodynamically stable. HR controlled.  -Hx HTN, HLD, cardiopulmonary arrest, CPR, ROSC, s/p OPCAB Ef nl  -metoprolol 12.5mg q 6 started today, continue asa 81mg daily, plavix 75mg daily, atorvastatin 40mg QHS  -monitor HR/BP/tele    Respiratory: 02 Sat = 100% on RA.  -Encourage ambulation, C+DB and Use of IS 10x / hr while awake.  -CXR- tiny R atelectasis vs pleural effusion s/o chest tube removal, no obvious PTX    GI: Stable.  -continue protonix for GI PPX.  -continue colace/senna for bowel regimen  -PO Diet.    Renal / : Cr 0.83  -Monitor renal function.  -Monitor I/O's.  -sotelo removed- ff/u TOV     Endocrine:    -A1c 5.9- continue ISS, monitor FS  -TSH WNL     Hematologic: H&H 13/38  -f/u AM CBC.    ID: afebrile, WBC 10  -complete periop abx  -Observe for SIRS/Sepsis Syndrome.    Prophylaxis:  -DVT prophylaxis with 5000 SubQ Heparin q8h.  -SCD's    Disposition:  -Home when medically ready

## 2018-02-13 NOTE — PHYSICAL THERAPY INITIAL EVALUATION ADULT - GENERAL OBSERVATIONS, REHAB EVAL
patient received seated out of bed with no acute distress. +EKG, +geovanna, +central line, +chest tube to wall suction, +temporary pacemaker, +sotelo, +NC 6L/min, +SCDs

## 2018-02-14 VITALS — BODY MASS INDEX: 29.4 KG/M2 | WEIGHT: 194.01 LBS | HEIGHT: 68.11 IN

## 2018-02-14 DIAGNOSIS — Z86.74 PERSONAL HISTORY OF SUDDEN CARDIAC ARREST: ICD-10-CM

## 2018-02-14 DIAGNOSIS — Z82.49 FAMILY HISTORY OF ISCHEMIC HEART DISEASE AND OTHER DISEASES OF THE CIRCULATORY SYSTEM: ICD-10-CM

## 2018-02-14 DIAGNOSIS — Z78.9 OTHER SPECIFIED HEALTH STATUS: ICD-10-CM

## 2018-02-14 DIAGNOSIS — Z86.39 PERSONAL HISTORY OF OTHER ENDOCRINE, NUTRITIONAL AND METABOLIC DISEASE: ICD-10-CM

## 2018-02-14 PROBLEM — Z09 POSTOP CHECK: Status: ACTIVE | Noted: 2018-02-14

## 2018-02-14 LAB
ANION GAP SERPL CALC-SCNC: 11 MMOL/L — SIGNIFICANT CHANGE UP (ref 5–17)
BUN SERPL-MCNC: 21 MG/DL — SIGNIFICANT CHANGE UP (ref 7–23)
CALCIUM SERPL-MCNC: 8.3 MG/DL — LOW (ref 8.4–10.5)
CHLORIDE SERPL-SCNC: 99 MMOL/L — SIGNIFICANT CHANGE UP (ref 96–108)
CO2 SERPL-SCNC: 24 MMOL/L — SIGNIFICANT CHANGE UP (ref 22–31)
CREAT SERPL-MCNC: 0.92 MG/DL — SIGNIFICANT CHANGE UP (ref 0.5–1.3)
GLUCOSE BLDC GLUCOMTR-MCNC: 123 MG/DL — HIGH (ref 70–99)
GLUCOSE BLDC GLUCOMTR-MCNC: 129 MG/DL — HIGH (ref 70–99)
GLUCOSE BLDC GLUCOMTR-MCNC: 135 MG/DL — HIGH (ref 70–99)
GLUCOSE BLDC GLUCOMTR-MCNC: 143 MG/DL — HIGH (ref 70–99)
GLUCOSE SERPL-MCNC: 147 MG/DL — HIGH (ref 70–99)
HCT VFR BLD CALC: 35 % — LOW (ref 39–50)
HGB BLD-MCNC: 11.4 G/DL — LOW (ref 13–17)
MAGNESIUM SERPL-MCNC: 2.5 MG/DL — SIGNIFICANT CHANGE UP (ref 1.6–2.6)
MCHC RBC-ENTMCNC: 29.1 PG — SIGNIFICANT CHANGE UP (ref 27–34)
MCHC RBC-ENTMCNC: 32.6 G/DL — SIGNIFICANT CHANGE UP (ref 32–36)
MCV RBC AUTO: 89.3 FL — SIGNIFICANT CHANGE UP (ref 80–100)
PLATELET # BLD AUTO: 202 K/UL — SIGNIFICANT CHANGE UP (ref 150–400)
POTASSIUM SERPL-MCNC: 4.1 MMOL/L — SIGNIFICANT CHANGE UP (ref 3.5–5.3)
POTASSIUM SERPL-SCNC: 4.1 MMOL/L — SIGNIFICANT CHANGE UP (ref 3.5–5.3)
RBC # BLD: 3.92 M/UL — LOW (ref 4.2–5.8)
RBC # FLD: 13.6 % — SIGNIFICANT CHANGE UP (ref 10.3–16.9)
SODIUM SERPL-SCNC: 134 MMOL/L — LOW (ref 135–145)
WBC # BLD: 11.4 K/UL — HIGH (ref 3.8–10.5)
WBC # FLD AUTO: 11.4 K/UL — HIGH (ref 3.8–10.5)

## 2018-02-14 PROCEDURE — 71046 X-RAY EXAM CHEST 2 VIEWS: CPT | Mod: 26

## 2018-02-14 PROCEDURE — 71045 X-RAY EXAM CHEST 1 VIEW: CPT | Mod: 26,59

## 2018-02-14 RX ADMIN — Medication 100 MILLIGRAM(S): at 05:58

## 2018-02-14 RX ADMIN — ATORVASTATIN CALCIUM 40 MILLIGRAM(S): 80 TABLET, FILM COATED ORAL at 21:35

## 2018-02-14 RX ADMIN — Medication 100 MILLIGRAM(S): at 21:35

## 2018-02-14 RX ADMIN — HEPARIN SODIUM 5000 UNIT(S): 5000 INJECTION INTRAVENOUS; SUBCUTANEOUS at 13:57

## 2018-02-14 RX ADMIN — Medication 12.5 MILLIGRAM(S): at 19:28

## 2018-02-14 RX ADMIN — Medication 12.5 MILLIGRAM(S): at 05:59

## 2018-02-14 RX ADMIN — HEPARIN SODIUM 5000 UNIT(S): 5000 INJECTION INTRAVENOUS; SUBCUTANEOUS at 21:35

## 2018-02-14 RX ADMIN — SENNA PLUS 2 TABLET(S): 8.6 TABLET ORAL at 21:35

## 2018-02-14 RX ADMIN — Medication 100 MILLIGRAM(S): at 05:59

## 2018-02-14 RX ADMIN — PANTOPRAZOLE SODIUM 40 MILLIGRAM(S): 20 TABLET, DELAYED RELEASE ORAL at 06:00

## 2018-02-14 RX ADMIN — OXYCODONE AND ACETAMINOPHEN 1 TABLET(S): 5; 325 TABLET ORAL at 10:47

## 2018-02-14 RX ADMIN — OXYCODONE AND ACETAMINOPHEN 1 TABLET(S): 5; 325 TABLET ORAL at 05:58

## 2018-02-14 RX ADMIN — OXYCODONE AND ACETAMINOPHEN 1 TABLET(S): 5; 325 TABLET ORAL at 07:00

## 2018-02-14 RX ADMIN — CLOPIDOGREL BISULFATE 75 MILLIGRAM(S): 75 TABLET, FILM COATED ORAL at 10:47

## 2018-02-14 RX ADMIN — OXYCODONE AND ACETAMINOPHEN 1 TABLET(S): 5; 325 TABLET ORAL at 17:21

## 2018-02-14 RX ADMIN — Medication 650 MILLIGRAM(S): at 20:03

## 2018-02-14 RX ADMIN — OXYCODONE AND ACETAMINOPHEN 1 TABLET(S): 5; 325 TABLET ORAL at 16:58

## 2018-02-14 RX ADMIN — Medication 650 MILLIGRAM(S): at 21:00

## 2018-02-14 RX ADMIN — HEPARIN SODIUM 5000 UNIT(S): 5000 INJECTION INTRAVENOUS; SUBCUTANEOUS at 05:59

## 2018-02-14 RX ADMIN — OXYCODONE AND ACETAMINOPHEN 1 TABLET(S): 5; 325 TABLET ORAL at 02:34

## 2018-02-14 RX ADMIN — Medication 12.5 MILLIGRAM(S): at 13:57

## 2018-02-14 RX ADMIN — OXYCODONE AND ACETAMINOPHEN 1 TABLET(S): 5; 325 TABLET ORAL at 01:34

## 2018-02-14 RX ADMIN — Medication 81 MILLIGRAM(S): at 10:47

## 2018-02-14 RX ADMIN — Medication 100 MILLIGRAM(S): at 13:57

## 2018-02-14 RX ADMIN — OXYCODONE AND ACETAMINOPHEN 1 TABLET(S): 5; 325 TABLET ORAL at 11:53

## 2018-02-14 RX ADMIN — Medication 12.5 MILLIGRAM(S): at 00:26

## 2018-02-14 NOTE — CHART NOTE - NSCHARTNOTEFT_GEN_A_CORE
As per Dr. Nichole, epicardial pacing wires removed this morning at bedside. Ventricular epicardial wire pulled without resistance. Patient tolerated procedure well and remained hemodynamically stable. Patient to remain on bedrest for 1 hour with q15 vital checks. patient and RN aware and understand plan.

## 2018-02-14 NOTE — PROGRESS NOTE ADULT - SUBJECTIVE AND OBJECTIVE BOX
Patient discussed on morning rounds with Dr. Nichole      Operation / Date: 2/12/18 OPCAB     SUBJECTIVE ASSESSMENT:  Patient seen this morning at bedside, doing well and not offering any complaints at this time. Ambulated 8 times yesterday and agreeable to walk 15 times today. He denies any chest pain, shortness of breath or palpitations. Had a normal bowel movement this morning.     Vital Signs Last 24 Hrs  T(C): 37.3 (14 Feb 2018 13:00), Max: 37.8 (13 Feb 2018 16:48)  T(F): 99.2 (14 Feb 2018 13:00), Max: 100 (13 Feb 2018 16:48)  HR: 86 (14 Feb 2018 12:01) (72 - 87)  BP: 117/67 (14 Feb 2018 13:00) (111/61 - 119/63)  BP(mean): 81 (14 Feb 2018 00:28) (74 - 81)  RR: 14 (14 Feb 2018 13:00) (14 - 20)  SpO2: 94% (14 Feb 2018 06:00) (94% - 98%)  I&O's Detail    13 Feb 2018 07:01  -  14 Feb 2018 07:00  --------------------------------------------------------  IN:    Albumin 5%  - 250 mL: 600 mL    IV PiggyBack: 150 mL    Oral Fluid: 300 mL    sodium chloride 0.45%.: 40 mL  Total IN: 1090 mL    OUT:    Chest Tube: 60 mL    Chest Tube: 70 mL    Indwelling Catheter - Urethral: 175 mL    Voided: 140 mL  Total OUT: 445 mL    Total NET: 645 mL    CHEST TUBE:  No  RIYA DRAIN: No  EPICARDIAL WIRES: No  TIE DOWNS: Yes   TSE: No    PHYSICAL EXAM:    General: Patient lying comfortably in bed, no acute distress     Neurological: Alert and oriented. No focal neurological deficits     Cardiovascular: S1S2, RRR, no murmurs appreciated on exam     Respiratory: Clear to ausculation bilaterally     Gastrointestinal: Abdomen soft, non tender, non distended     Extremities: Warm and well perfused. No edema or calf tenderness     Vascular: Peripheral pulses 2+ bilaterally     Incision Sites: Sternotomy incision covered with OR mepilex dressing to be removed on POD#3   Left radial cath site C/D/I, no drainage, surrounding erythema or hematoma  Chest tube/riya sites c/d/i with tie downs in place     LABS:                        11.4   11.4  )-----------( 202      ( 14 Feb 2018 06:53 )             35.0       COUMADIN:  No    PT/INR - ( 13 Feb 2018 10:33 )   PT: 16.3 sec;   INR: 1.46          PTT - ( 13 Feb 2018 10:33 )  PTT:31.7 sec    02-14    134<L>  |  99  |  21  ----------------------------<  147<H>  4.1   |  24  |  0.92    Ca    8.3<L>      14 Feb 2018 06:53  Phos  3.5     02-13  Mg     2.5     02-14    MEDICATIONS  (STANDING):  aspirin enteric coated 81 milliGRAM(s) Oral daily  atorvastatin 40 milliGRAM(s) Oral at bedtime  clopidogrel Tablet 75 milliGRAM(s) Oral daily  docusate sodium 100 milliGRAM(s) Oral three times a day  heparin  Injectable 5000 Unit(s) SubCutaneous every 8 hours  insulin lispro (HumaLOG) corrective regimen sliding scale   SubCutaneous Before meals and at bedtime  lidocaine   Patch 1 Patch Transdermal daily  metoprolol     tartrate 12.5 milliGRAM(s) Oral every 6 hours  pantoprazole    Tablet 40 milliGRAM(s) Oral before breakfast  senna 2 Tablet(s) Oral at bedtime    MEDICATIONS  (PRN):  acetaminophen   Tablet. 650 milliGRAM(s) Oral every 6 hours PRN Mild Pain (1 - 3)  oxyCODONE    5 mG/acetaminophen 325 mG 2 Tablet(s) Oral every 6 hours PRN Severe Pain (7 - 10)  oxyCODONE    5 mG/acetaminophen 325 mG 1 Tablet(s) Oral every 4 hours PRN Moderate Pain (4 - 6)    RADIOLOGY & ADDITIONAL TESTS:  2/14/18 CXR: Pending official read, no obvious ptx, pleural effusions or infiltrates     A/P: 47 year old male, PMHx HTN, hLD, CAD (refused surgery 5 years ago after cath revealed 3V CAD) BIB[facility] to [facility] after Vfib/cardiac arrest requiring CPR after a soccer game. Cardiac cath revealed 3V CAD and he was transferred to [facility] CCU. CT surgery was consulted and patient completed preoperative workup and underwent OPCAB x3 on 2/12/18. Procedure was uncomplicated and he was transferred to  on POD#1. No acute events overnight,     Neurovascular: Stable. Pain well controlled with current regimen.  - continue tylenol and percocet PRN     Cardiovascular: Hemodynamically stable. HR controlled.  - CAD s/p OPCAB, continue asa 81mg, atorvastatin 40mg qhs, plavix 75mg, metoprolol 12.5mg q6   - Epicardial pacing wires removed this AM per Dr. Nichole     Respiratory: 02 Sat = 94% on RA.  - CXR PA/LAt stable   - Encourage C+DB and Use of IS 10x / hr while awake.    GI: Stable.  - Continue PO diet   - Cotninue protonix 40mg for GI ppx   - Continue bowel regimen     Renal / : Cr 0.92, no active issues   - Monitor renal function.  - Monitor I/O's.    Endocrine:  hgba1c 5.9, TSH 0.522  - no active issues     Prophylaxis:  -DVT prophylaxis with 5000 SubQ Heparin q8h.  -SCD's    Disposition: Home tomorrow

## 2018-02-15 ENCOUNTER — TRANSCRIPTION ENCOUNTER (OUTPATIENT)
Age: 48
End: 2018-02-15

## 2018-02-15 VITALS — TEMPERATURE: 98 F

## 2018-02-15 LAB
ANION GAP SERPL CALC-SCNC: 12 MMOL/L — SIGNIFICANT CHANGE UP (ref 5–17)
BUN SERPL-MCNC: 20 MG/DL — SIGNIFICANT CHANGE UP (ref 7–23)
CALCIUM SERPL-MCNC: 9 MG/DL — SIGNIFICANT CHANGE UP (ref 8.4–10.5)
CHLORIDE SERPL-SCNC: 99 MMOL/L — SIGNIFICANT CHANGE UP (ref 96–108)
CO2 SERPL-SCNC: 26 MMOL/L — SIGNIFICANT CHANGE UP (ref 22–31)
CREAT SERPL-MCNC: 0.97 MG/DL — SIGNIFICANT CHANGE UP (ref 0.5–1.3)
GLUCOSE BLDC GLUCOMTR-MCNC: 129 MG/DL — HIGH (ref 70–99)
GLUCOSE BLDC GLUCOMTR-MCNC: 145 MG/DL — HIGH (ref 70–99)
GLUCOSE SERPL-MCNC: 138 MG/DL — HIGH (ref 70–99)
HCT VFR BLD CALC: 36 % — LOW (ref 39–50)
HGB BLD-MCNC: 11.5 G/DL — LOW (ref 13–17)
MAGNESIUM SERPL-MCNC: 2.6 MG/DL — SIGNIFICANT CHANGE UP (ref 1.6–2.6)
MCHC RBC-ENTMCNC: 28.8 PG — SIGNIFICANT CHANGE UP (ref 27–34)
MCHC RBC-ENTMCNC: 31.9 G/DL — LOW (ref 32–36)
MCV RBC AUTO: 90 FL — SIGNIFICANT CHANGE UP (ref 80–100)
PLATELET # BLD AUTO: 242 K/UL — SIGNIFICANT CHANGE UP (ref 150–400)
POTASSIUM SERPL-MCNC: 4.4 MMOL/L — SIGNIFICANT CHANGE UP (ref 3.5–5.3)
POTASSIUM SERPL-SCNC: 4.4 MMOL/L — SIGNIFICANT CHANGE UP (ref 3.5–5.3)
RBC # BLD: 4 M/UL — LOW (ref 4.2–5.8)
RBC # FLD: 13.8 % — SIGNIFICANT CHANGE UP (ref 10.3–16.9)
SODIUM SERPL-SCNC: 137 MMOL/L — SIGNIFICANT CHANGE UP (ref 135–145)
WBC # BLD: 11.1 K/UL — HIGH (ref 3.8–10.5)
WBC # FLD AUTO: 11.1 K/UL — HIGH (ref 3.8–10.5)

## 2018-02-15 PROCEDURE — 86901 BLOOD TYPING SEROLOGIC RH(D): CPT

## 2018-02-15 PROCEDURE — 93005 ELECTROCARDIOGRAM TRACING: CPT

## 2018-02-15 PROCEDURE — 71046 X-RAY EXAM CHEST 2 VIEWS: CPT

## 2018-02-15 PROCEDURE — 80048 BASIC METABOLIC PNL TOTAL CA: CPT

## 2018-02-15 PROCEDURE — 82803 BLOOD GASES ANY COMBINATION: CPT

## 2018-02-15 PROCEDURE — 86900 BLOOD TYPING SEROLOGIC ABO: CPT

## 2018-02-15 PROCEDURE — 83605 ASSAY OF LACTIC ACID: CPT

## 2018-02-15 PROCEDURE — 84132 ASSAY OF SERUM POTASSIUM: CPT

## 2018-02-15 PROCEDURE — 84295 ASSAY OF SERUM SODIUM: CPT

## 2018-02-15 PROCEDURE — 97116 GAIT TRAINING THERAPY: CPT

## 2018-02-15 PROCEDURE — 82962 GLUCOSE BLOOD TEST: CPT

## 2018-02-15 PROCEDURE — 97161 PT EVAL LOW COMPLEX 20 MIN: CPT

## 2018-02-15 PROCEDURE — 82330 ASSAY OF CALCIUM: CPT

## 2018-02-15 PROCEDURE — 85730 THROMBOPLASTIN TIME PARTIAL: CPT

## 2018-02-15 PROCEDURE — 85610 PROTHROMBIN TIME: CPT

## 2018-02-15 PROCEDURE — C1889: CPT

## 2018-02-15 PROCEDURE — 94002 VENT MGMT INPAT INIT DAY: CPT

## 2018-02-15 PROCEDURE — 93306 TTE W/DOPPLER COMPLETE: CPT

## 2018-02-15 PROCEDURE — 84484 ASSAY OF TROPONIN QUANT: CPT

## 2018-02-15 PROCEDURE — 84100 ASSAY OF PHOSPHORUS: CPT

## 2018-02-15 PROCEDURE — 85027 COMPLETE CBC AUTOMATED: CPT

## 2018-02-15 PROCEDURE — P9045: CPT

## 2018-02-15 PROCEDURE — 71045 X-RAY EXAM CHEST 1 VIEW: CPT

## 2018-02-15 PROCEDURE — 80053 COMPREHEN METABOLIC PANEL: CPT

## 2018-02-15 PROCEDURE — 83735 ASSAY OF MAGNESIUM: CPT

## 2018-02-15 PROCEDURE — 70450 CT HEAD/BRAIN W/O DYE: CPT

## 2018-02-15 PROCEDURE — 94150 VITAL CAPACITY TEST: CPT

## 2018-02-15 PROCEDURE — 86850 RBC ANTIBODY SCREEN: CPT

## 2018-02-15 PROCEDURE — 93880 EXTRACRANIAL BILAT STUDY: CPT

## 2018-02-15 PROCEDURE — 85025 COMPLETE CBC W/AUTO DIFF WBC: CPT

## 2018-02-15 PROCEDURE — 82553 CREATINE MB FRACTION: CPT

## 2018-02-15 PROCEDURE — 36415 COLL VENOUS BLD VENIPUNCTURE: CPT

## 2018-02-15 PROCEDURE — 82550 ASSAY OF CK (CPK): CPT

## 2018-02-15 PROCEDURE — 86923 COMPATIBILITY TEST ELECTRIC: CPT

## 2018-02-15 RX ORDER — ASPIRIN/CALCIUM CARB/MAGNESIUM 324 MG
1 TABLET ORAL
Qty: 0 | Refills: 0 | COMMUNITY

## 2018-02-15 RX ORDER — UBIDECARENONE 100 MG
1 CAPSULE ORAL
Qty: 0 | Refills: 0 | COMMUNITY

## 2018-02-15 RX ORDER — METOPROLOL TARTRATE 50 MG
1 TABLET ORAL
Qty: 60 | Refills: 0
Start: 2018-02-15 | End: 2018-03-16

## 2018-02-15 RX ORDER — DOCUSATE SODIUM 100 MG
1 CAPSULE ORAL
Qty: 21 | Refills: 0
Start: 2018-02-15 | End: 2018-02-21

## 2018-02-15 RX ORDER — LOSARTAN POTASSIUM 100 MG/1
1 TABLET, FILM COATED ORAL
Qty: 0 | Refills: 0 | COMMUNITY

## 2018-02-15 RX ORDER — ASPIRIN/CALCIUM CARB/MAGNESIUM 324 MG
1 TABLET ORAL
Qty: 30 | Refills: 0
Start: 2018-02-15 | End: 2018-03-16

## 2018-02-15 RX ORDER — ACETAMINOPHEN 500 MG
2 TABLET ORAL
Qty: 240 | Refills: 0
Start: 2018-02-15 | End: 2018-03-16

## 2018-02-15 RX ORDER — ATORVASTATIN CALCIUM 80 MG/1
1 TABLET, FILM COATED ORAL
Qty: 0 | Refills: 0 | COMMUNITY

## 2018-02-15 RX ORDER — SENNA PLUS 8.6 MG/1
2 TABLET ORAL
Qty: 14 | Refills: 0
Start: 2018-02-15 | End: 2018-02-21

## 2018-02-15 RX ORDER — MINOXIDIL 3 G/60ML
0 SOLUTION TOPICAL
Qty: 0 | Refills: 0 | COMMUNITY

## 2018-02-15 RX ORDER — ATORVASTATIN CALCIUM 80 MG/1
1 TABLET, FILM COATED ORAL
Qty: 30 | Refills: 0
Start: 2018-02-15 | End: 2018-03-16

## 2018-02-15 RX ORDER — CARVEDILOL PHOSPHATE 80 MG/1
1 CAPSULE, EXTENDED RELEASE ORAL
Qty: 0 | Refills: 0 | COMMUNITY

## 2018-02-15 RX ADMIN — OXYCODONE AND ACETAMINOPHEN 1 TABLET(S): 5; 325 TABLET ORAL at 00:08

## 2018-02-15 RX ADMIN — OXYCODONE AND ACETAMINOPHEN 1 TABLET(S): 5; 325 TABLET ORAL at 01:08

## 2018-02-15 RX ADMIN — Medication 12.5 MILLIGRAM(S): at 06:40

## 2018-02-15 RX ADMIN — OXYCODONE AND ACETAMINOPHEN 1 TABLET(S): 5; 325 TABLET ORAL at 09:20

## 2018-02-15 RX ADMIN — Medication 100 MILLIGRAM(S): at 06:40

## 2018-02-15 RX ADMIN — Medication 81 MILLIGRAM(S): at 12:10

## 2018-02-15 RX ADMIN — HEPARIN SODIUM 5000 UNIT(S): 5000 INJECTION INTRAVENOUS; SUBCUTANEOUS at 06:40

## 2018-02-15 RX ADMIN — OXYCODONE AND ACETAMINOPHEN 1 TABLET(S): 5; 325 TABLET ORAL at 05:10

## 2018-02-15 RX ADMIN — Medication 12.5 MILLIGRAM(S): at 00:08

## 2018-02-15 RX ADMIN — PANTOPRAZOLE SODIUM 40 MILLIGRAM(S): 20 TABLET, DELAYED RELEASE ORAL at 06:41

## 2018-02-15 NOTE — DISCHARGE NOTE ADULT - CARE PLAN
Principal Discharge DX:	CAD (coronary artery disease)  Goal:	s/p CABG  Assessment and plan of treatment:	-Please follow up with Dr. Nichole on Tuesday 2/27/18 at 3:00 PM_.  The office is located at [facility], [facility], 4th floor. Call us with any questions #[phone].  - Please follow up with Dr. Aldo Monge within 1-2 weeks from discharge. His office is located at [address] [place] [address] 2nd floor. Please call [phone] with any questions.     -Walk daily as tolerated and use your incentive spirometer every hour.    -No driving or strenuous activity/exercise for 6 weeks, or until cleared by your surgeon.    -Gently clean your incisions with anti-bacterial soap and water, pat dry.  You may leave them open to air.    -Call your doctor if you have shortness of breath, chest pain not relieved by pain medication, dizziness, fever >101.5, or increased redness or drainage from incisions.

## 2018-02-15 NOTE — DISCHARGE NOTE ADULT - MEDICATION SUMMARY - MEDICATIONS TO TAKE
I will START or STAY ON the medications listed below when I get home from the hospital:    oxyCODONE-acetaminophen 5 mg-325 mg oral tablet  -- 1-2 tab(s) by mouth every 6 hours, As needed, Severe Pain (7 - 10) MDD:8. Please do not exceed more than 4g of acetaminophen per day   -- Indication: For As needed for severe pain    aspirin 81 mg oral delayed release tablet  -- 1 tab(s) by mouth once a day  -- Indication: For Heart Disease     acetaminophen 325 mg oral tablet  -- 2 tab(s) by mouth every 6 hours, As needed, Mild Pain (1 - 3). Do not exceed more than 4g of acetaminophen per day   -- Indication: For As needed for mild pain    atorvastatin 40 mg oral tablet  -- 1 tab(s) by mouth once a day (at bedtime)  -- Indication: For Cholesterol    metoprolol tartrate 25 mg oral tablet  -- 1 tab(s) by mouth every 12 hours x 30 days   -- Indication: For Blood pressure    docusate sodium 100 mg oral capsule  -- 1 cap(s) by mouth 3 times a day  -- Indication: For Stool softener, hold for loose stool    senna oral tablet  -- 2 tab(s) by mouth once a day (at bedtime)  -- Indication: For Stool softener, hold for loose stool

## 2018-02-15 NOTE — DISCHARGE NOTE ADULT - CARE PROVIDER_API CALL
Zachary Nichole), Cardiovascular Surgery  [address]  [address]th [place] [address]  Phone: [phone]  Fax: [phone]    Aldo Monge), Cardiovascular Disease  [facility]  [place] [address]  Phone: [phone]  Fax: [phone]

## 2018-02-15 NOTE — DISCHARGE NOTE ADULT - PLAN OF CARE
s/p CABG -Please follow up with Dr. Nichole on Tuesday 2/27/18 at 3:00 PM_.  The office is located at [facility], [facility], 4th floor. Call us with any questions #[phone].  - Please follow up with Dr. Aldo Monge within 1-2 weeks from discharge. His office is located at [address] [place] [address] 2nd floor. Please call [phone] with any questions.     -Walk daily as tolerated and use your incentive spirometer every hour.    -No driving or strenuous activity/exercise for 6 weeks, or until cleared by your surgeon.    -Gently clean your incisions with anti-bacterial soap and water, pat dry.  You may leave them open to air.    -Call your doctor if you have shortness of breath, chest pain not relieved by pain medication, dizziness, fever >101.5, or increased redness or drainage from incisions.

## 2018-02-15 NOTE — PROGRESS NOTE ADULT - SUBJECTIVE AND OBJECTIVE BOX
Patient discussed on morning rounds with Dr. Nichole      Operation / Date: 2/12/18 OPCAB x 3     Surgeon: Dr. Nichole     Referring Physician: Dr. Aldo Monge     SUBJECTIVE ASSESSMENT:  Patient seen this morning at bedside, doing well and not offering any complaints at this time. Denies any chest pain, shortness of breath or palpitations.     Hospital Course:    Vital Signs Last 24 Hrs  T(C): 36.9 (15 Feb 2018 09:31), Max: 37.6 (14 Feb 2018 16:20)  T(F): 98.5 (15 Feb 2018 09:31), Max: 99.7 (14 Feb 2018 16:20)  HR: 80 (15 Feb 2018 09:20) (74 - 86)  BP: 127/73 (15 Feb 2018 09:20) (114/57 - 127/75)  BP(mean): 94 (15 Feb 2018 09:20) (81 - 95)  RR: 16 (15 Feb 2018 08:36) (14 - 16)  SpO2: 96% (15 Feb 2018 08:36) (96% - 96%)  I&O's Detail    14 Feb 2018 07:01  -  15 Feb 2018 07:00  --------------------------------------------------------  IN:    Oral Fluid: 180 mL  Total IN: 180 mL    OUT:    Voided: 500 mL  Total OUT: 500 mL    Total NET: -320 mL          EPICARDIAL WIRES REMOVED: Yes/No.  TIE DOWNS REMOVED: Yes/No.    PHYSICAL EXAM:    General:     Neurological:    Cardiovascular:    Respiratory:    Gastrointestinal:    Extremities:    Vascular:    Incision Sites:    LABS:                        11.5   11.1  )-----------( 242      ( 15 Feb 2018 05:53 )             36.0       COUMADIN:  Yes/No.        DOSE:                  INDICATION:                GOAL INR:        02-15    137  |  99  |  20  ----------------------------<  138<H>  4.4   |  26  |  0.97    Ca    9.0      15 Feb 2018 05:53  Mg     2.6     02-15            MEDICATIONS  (STANDING):  aspirin enteric coated 81 milliGRAM(s) Oral daily  atorvastatin 40 milliGRAM(s) Oral at bedtime  dextrose 50% Injectable 50 milliLiter(s) IV Push every 15 minutes  dextrose 50% Injectable 25 milliLiter(s) IV Push every 15 minutes  docusate sodium 100 milliGRAM(s) Oral three times a day  heparin  Injectable 5000 Unit(s) SubCutaneous every 8 hours  lidocaine   Patch 1 Patch Transdermal daily  metoprolol     tartrate 12.5 milliGRAM(s) Oral every 6 hours  pantoprazole    Tablet 40 milliGRAM(s) Oral before breakfast  senna 2 Tablet(s) Oral at bedtime  sodium chloride 0.45%. 1000 milliLiter(s) (10 mL/Hr) IV Continuous <Continuous>      Discharge CXR:    Discharge ECHO: Patient discussed on morning rounds with Dr. Nichole      Operation / Date: 2/12/18 OPCAB x 3     Surgeon: Dr. Nichole     Referring Physician: Dr. Aldo Monge     SUBJECTIVE ASSESSMENT:  Patient seen this morning at bedside, doing well and not offering any complaints at this time. Denies any chest pain, shortness of breath or palpitations. Patient agreeable and ready for discharge home.      Hospital Course:  47 year old male, PMHx HTN, hLD, CAD (refused surgery 5 years ago after cath revealed 3V CAD) [facility] to [facility] after Vfib/cardiac arrest requiring CPR after a soccer game. Patient was defibrillated twice and ROSC was achieved. At the outside hospital he was started on an amiodarone drip and admitted to the CCU. Cardiac cath revealed 3V CAD and he was transferred to [facility] CCU for further management. CT surgery was consulted and patient completed preoperative workup. He underwent OPCAB x3 on 2/12/18 with Dr. Nichole. Procedure was uncomplicated and he was transferred the CT ICU post operatively stable and on no drips. He was extubated the same day, he continued to remain stable and was transferred to  on POD#1. The rest of his hospital course was uneventful, he remained stable, ambulating without difficulty and as per Dr. Nichole is ready for discharge home on POD#3. t    Vital Signs Last 24 Hrs  T(C): 36.9 (15 Feb 2018 09:31), Max: 37.6 (14 Feb 2018 16:20)  T(F): 98.5 (15 Feb 2018 09:31), Max: 99.7 (14 Feb 2018 16:20)  HR: 80 (15 Feb 2018 09:20) (74 - 86)  BP: 127/73 (15 Feb 2018 09:20) (114/57 - 127/75)  BP(mean): 94 (15 Feb 2018 09:20) (81 - 95)  RR: 16 (15 Feb 2018 08:36) (14 - 16)  SpO2: 96% (15 Feb 2018 08:36) (96% - 96%)  I&O's Detail    14 Feb 2018 07:01  -  15 Feb 2018 07:00  --------------------------------------------------------  IN:    Oral Fluid: 180 mL  Total IN: 180 mL    OUT:    Voided: 500 mL  Total OUT: 500 mL    Total NET: -320 mL    EPICARDIAL WIRES REMOVED: Yes  TIE DOWNS REMOVED: Yes    PHYSICAL EXAM:    General: Patient lying comfortably in bed, no acute distress     Neurological: Alert and oriented. No focal neurological deficits     Cardiovascular: S1S2, RRR, no murmurs appreciated on exam     Respiratory: Clear to ausculation bilaterally     Gastrointestinal: Abdomen soft, non tender, non distended     Extremities: Warm and well perfused. No edema or calf tenderness     Vascular: Peripheral pulses 2+ bilaterally     Incision Sites: Sternotomy incision C/D/I, no drainage or surrounding erythema. No sternal click   Left radial cath site C/D/I, no drainage, surrounding erythema or hematoma  Chest tube/riya sites c/d/i     LABS:                        11.5   11.1  )-----------( 242      ( 15 Feb 2018 05:53 )             36.0       COUMADIN: No        02-15    137  |  99  |  20  ----------------------------<  138<H>  4.4   |  26  |  0.97    Ca    9.0      15 Feb 2018 05:53  Mg     2.6     02-15            MEDICATIONS  (STANDING): See Med Rec     Discharge CXR: < from: Xray Chest 2 Views PA/Lat (02.14.18 @ 12:54) >  Interval resolution of right mid and lower lung zone   atelectasis/pneumonitis/pleural effusion.    < end of copied text >      Discharge ECHO: < from: Echocardiogram (02.09.18 @ 13:32) >  The left atrium is mildly dilated. Right atrial size is normal.There is   mild   aortic valve thickening. No aortic regurgitation noted. No   hemodynamically   significant valvular aortic stenosis.  Structurally normal mitral valve.   There   is trace mitral regurgitation.Structurally normal tricuspid valve. There   was   insufficient TR detected from which to calculate pulmonary artery   systolic   pressure.  The pulmonic valve is not well visualized. The right   ventricleis   borderline dilated. The right ventricular systolic function is normal.    Normal   left ventricular size and wall thickness. The left ventricular wall   motion is   normal. The left ventricular ejection fraction is 63%.  No aortic root   dilatation.There is no pericardial effusion.No prior study for   comparison.    < end of copied text >    -Please follow up with Dr. Nichole on Tuesday 2/27/18 at 3:00 PM_.  The office is located at [facility], 4th floor. Call us with any questions #[phone].  - Please follow up with Dr. Aldo Monge within 1-2 weeks from discharge. His office is located at [address] [place] [address] 2nd floor. Please call [phone] with any questions.

## 2018-02-15 NOTE — DISCHARGE NOTE ADULT - HOSPITAL COURSE
47 year old male, PMHx HTN, hLD, CAD (refused surgery 5 years ago after cath revealed 3V CAD) [facility] to [facility] after Vfib/cardiac arrest requiring CPR after a soccer game. Patient was defibrillated twice and ROSC was achieved. At the outside hospital he was started on an amiodarone drip and admitted to the CCU. Cardiac cath revealed 3V CAD and he was transferred to [facility] CCU for further management. CT surgery was consulted and patient completed preoperative workup. He underwent OPCAB x3 on 2/12/18 with Dr. Nichole. Procedure was uncomplicated and he was transferred the CT ICU post operatively stable and on no drips. He was extubated the same day, he continued to remain stable and was transferred to  on POD#1. The rest of his hospital course was uneventful, he remained stable, ambulating without difficulty and as per Dr. Nichole is ready for discharge home on POD#3. t

## 2018-02-15 NOTE — DISCHARGE NOTE ADULT - MEDICATION SUMMARY - MEDICATIONS TO STOP TAKING
I will STOP taking the medications listed below when I get home from the hospital:    atorvastatin 20 mg oral tablet  -- 1 tab(s) by mouth once a day    losartan 100 mg oral tablet  -- 1 tab(s) by mouth once a day    carvedilol 20 mg oral capsule, extended release  -- 1 cap(s) by mouth once a day (in the morning)

## 2018-02-15 NOTE — DISCHARGE NOTE ADULT - PATIENT PORTAL LINK FT
You can access the [facility] Patient Portal, offered by [facility], by registering with the following website: [email or web address]

## 2018-02-15 NOTE — DISCHARGE NOTE ADULT - CARE PROVIDERS DIRECT ADDRESSES
[email or web address]

## 2018-02-16 RX ORDER — ASPIRIN 81 MG
81 TABLET, DELAYED RELEASE (ENTERIC COATED) ORAL
Refills: 0 | Status: ACTIVE | COMMUNITY

## 2018-02-19 DIAGNOSIS — Z83.3 FAMILY HISTORY OF DIABETES MELLITUS: ICD-10-CM

## 2018-02-19 DIAGNOSIS — Z82.49 FAMILY HISTORY OF ISCHEMIC HEART DISEASE AND OTHER DISEASES OF THE CIRCULATORY SYSTEM: ICD-10-CM

## 2018-02-19 DIAGNOSIS — I10 ESSENTIAL (PRIMARY) HYPERTENSION: ICD-10-CM

## 2018-02-19 DIAGNOSIS — Z87.891 PERSONAL HISTORY OF NICOTINE DEPENDENCE: ICD-10-CM

## 2018-02-19 DIAGNOSIS — I25.119 ATHEROSCLEROTIC HEART DISEASE OF NATIVE CORONARY ARTERY WITH UNSPECIFIED ANGINA PECTORIS: ICD-10-CM

## 2018-02-19 DIAGNOSIS — I44.0 ATRIOVENTRICULAR BLOCK, FIRST DEGREE: ICD-10-CM

## 2018-02-19 DIAGNOSIS — I46.9 CARDIAC ARREST, CAUSE UNSPECIFIED: ICD-10-CM

## 2018-02-19 DIAGNOSIS — Z86.74 PERSONAL HISTORY OF SUDDEN CARDIAC ARREST: ICD-10-CM

## 2018-02-19 DIAGNOSIS — R73.03 PREDIABETES: ICD-10-CM

## 2018-02-19 DIAGNOSIS — E78.5 HYPERLIPIDEMIA, UNSPECIFIED: ICD-10-CM

## 2018-02-19 DIAGNOSIS — Z82.41 FAMILY HISTORY OF SUDDEN CARDIAC DEATH: ICD-10-CM

## 2018-02-19 DIAGNOSIS — Z79.82 LONG TERM (CURRENT) USE OF ASPIRIN: ICD-10-CM

## 2018-02-23 ENCOUNTER — RX RENEWAL (OUTPATIENT)
Age: 48
End: 2018-02-23

## 2018-02-23 ENCOUNTER — APPOINTMENT (OUTPATIENT)
Dept: HEART AND VASCULAR | Facility: CLINIC | Age: 48
End: 2018-02-23

## 2018-02-23 ENCOUNTER — OTHER (OUTPATIENT)
Age: 48
End: 2018-02-23

## 2018-02-23 RX ORDER — OXYCODONE AND ACETAMINOPHEN 5; 325 MG/1; MG/1
5-325 TABLET ORAL
Qty: 4 | Refills: 0 | Status: ACTIVE | COMMUNITY
Start: 2018-02-23 | End: 1900-01-01

## 2018-02-27 ENCOUNTER — APPOINTMENT (OUTPATIENT)
Dept: HEART AND VASCULAR | Facility: CLINIC | Age: 48
End: 2018-02-27
Payer: COMMERCIAL

## 2018-02-27 ENCOUNTER — APPOINTMENT (OUTPATIENT)
Dept: CARDIOTHORACIC SURGERY | Facility: CLINIC | Age: 48
End: 2018-02-27
Payer: COMMERCIAL

## 2018-02-27 VITALS
SYSTOLIC BLOOD PRESSURE: 136 MMHG | DIASTOLIC BLOOD PRESSURE: 74 MMHG | HEART RATE: 73 BPM | HEIGHT: 68 IN | WEIGHT: 185 LBS | BODY MASS INDEX: 28.04 KG/M2

## 2018-02-27 VITALS
SYSTOLIC BLOOD PRESSURE: 144 MMHG | BODY MASS INDEX: 28.13 KG/M2 | OXYGEN SATURATION: 97 % | TEMPERATURE: 97.6 F | HEART RATE: 78 BPM | RESPIRATION RATE: 18 BRPM | DIASTOLIC BLOOD PRESSURE: 75 MMHG | WEIGHT: 185 LBS

## 2018-02-27 DIAGNOSIS — Z83.3 FAMILY HISTORY OF DIABETES MELLITUS: ICD-10-CM

## 2018-02-27 DIAGNOSIS — Z80.3 FAMILY HISTORY OF MALIGNANT NEOPLASM OF BREAST: ICD-10-CM

## 2018-02-27 DIAGNOSIS — I25.10 ATHEROSCLEROTIC HEART DISEASE OF NATIVE CORONARY ARTERY W/OUT ANGINA PECTORIS: ICD-10-CM

## 2018-02-27 DIAGNOSIS — Z95.1 PRESENCE OF AORTOCORONARY BYPASS GRAFT: ICD-10-CM

## 2018-02-27 DIAGNOSIS — I10 ESSENTIAL (PRIMARY) HYPERTENSION: ICD-10-CM

## 2018-02-27 DIAGNOSIS — Z82.49 FAMILY HISTORY OF ISCHEMIC HEART DISEASE AND OTHER DISEASES OF THE CIRCULATORY SYSTEM: ICD-10-CM

## 2018-02-27 DIAGNOSIS — Z87.898 PERSONAL HISTORY OF OTHER SPECIFIED CONDITIONS: ICD-10-CM

## 2018-02-27 PROCEDURE — 99215 OFFICE O/P EST HI 40 MIN: CPT | Mod: 25

## 2018-02-27 PROCEDURE — 93000 ELECTROCARDIOGRAM COMPLETE: CPT

## 2018-02-27 PROCEDURE — 99024 POSTOP FOLLOW-UP VISIT: CPT

## 2018-02-27 RX ORDER — OXYCODONE HYDROCHLORIDE AND ACETAMINOPHEN 5; 325 MG/1; MG/1
5-325 TABLET ORAL
Refills: 0 | Status: COMPLETED | COMMUNITY
End: 2018-02-27

## 2018-03-01 PROBLEM — Z95.1 S/P CABG X 3: Status: ACTIVE | Noted: 2018-02-14

## 2018-03-01 PROBLEM — I10 HYPERTENSION, UNSPECIFIED TYPE: Status: ACTIVE | Noted: 2018-02-14

## 2018-03-01 PROBLEM — I25.10 CAD (CORONARY ARTERY DISEASE): Status: ACTIVE | Noted: 2018-02-14

## 2018-03-08 ENCOUNTER — OTHER (OUTPATIENT)
Age: 48
End: 2018-03-08

## 2018-03-13 ENCOUNTER — APPOINTMENT (OUTPATIENT)
Dept: MRI IMAGING | Facility: CLINIC | Age: 48
End: 2018-03-13
Payer: COMMERCIAL

## 2018-03-13 ENCOUNTER — OUTPATIENT (OUTPATIENT)
Dept: OUTPATIENT SERVICES | Facility: HOSPITAL | Age: 48
LOS: 1 days | End: 2018-03-13

## 2018-03-13 DIAGNOSIS — Z98.890 OTHER SPECIFIED POSTPROCEDURAL STATES: Chronic | ICD-10-CM

## 2018-03-13 DIAGNOSIS — S86.012A STRAIN OF LEFT ACHILLES TENDON, INITIAL ENCOUNTER: Chronic | ICD-10-CM

## 2018-03-13 PROCEDURE — 75561 CARDIAC MRI FOR MORPH W/DYE: CPT | Mod: 26

## 2018-03-13 PROCEDURE — 75565 CARD MRI VELOC FLOW MAPPING: CPT | Mod: 26

## 2018-03-20 ENCOUNTER — RX RENEWAL (OUTPATIENT)
Age: 48
End: 2018-03-20

## 2018-03-20 RX ORDER — ATORVASTATIN CALCIUM 40 MG/1
40 TABLET, FILM COATED ORAL
Qty: 30 | Refills: 3 | Status: ACTIVE | COMMUNITY
Start: 1900-01-01 | End: 1900-01-01

## 2018-03-26 ENCOUNTER — OTHER (OUTPATIENT)
Age: 48
End: 2018-03-26

## 2018-03-26 RX ORDER — METOPROLOL TARTRATE 25 MG/1
25 TABLET, FILM COATED ORAL DAILY
Qty: 30 | Refills: 0 | Status: ACTIVE | COMMUNITY

## 2019-01-02 PROBLEM — I25.10 ATHEROSCLEROTIC HEART DISEASE OF NATIVE CORONARY ARTERY WITHOUT ANGINA PECTORIS: Chronic | Status: ACTIVE | Noted: 2018-02-08

## 2019-01-02 PROBLEM — E78.5 HYPERLIPIDEMIA, UNSPECIFIED: Chronic | Status: ACTIVE | Noted: 2018-02-08

## 2019-01-02 PROBLEM — I10 ESSENTIAL (PRIMARY) HYPERTENSION: Chronic | Status: ACTIVE | Noted: 2018-02-08

## 2019-01-10 ENCOUNTER — APPOINTMENT (OUTPATIENT)
Dept: OTOLARYNGOLOGY | Facility: CLINIC | Age: 49
End: 2019-01-10
Payer: COMMERCIAL

## 2019-01-10 VITALS
RESPIRATION RATE: 15 BRPM | OXYGEN SATURATION: 99 % | DIASTOLIC BLOOD PRESSURE: 102 MMHG | TEMPERATURE: 97.7 F | HEART RATE: 85 BPM | SYSTOLIC BLOOD PRESSURE: 152 MMHG

## 2019-01-10 VITALS
DIASTOLIC BLOOD PRESSURE: 76 MMHG | HEART RATE: 84 BPM | OXYGEN SATURATION: 100 % | SYSTOLIC BLOOD PRESSURE: 153 MMHG | RESPIRATION RATE: 20 BRPM | TEMPERATURE: 97.8 F

## 2019-01-10 VITALS — SYSTOLIC BLOOD PRESSURE: 169 MMHG | HEART RATE: 85 BPM | DIASTOLIC BLOOD PRESSURE: 112 MMHG | OXYGEN SATURATION: 98 %

## 2019-01-10 DIAGNOSIS — R51 HEADACHE: ICD-10-CM

## 2019-01-10 DIAGNOSIS — Z86.79 PERSONAL HISTORY OF OTHER DISEASES OF THE CIRCULATORY SYSTEM: ICD-10-CM

## 2019-01-10 DIAGNOSIS — Z87.19 PERSONAL HISTORY OF OTHER DISEASES OF THE DIGESTIVE SYSTEM: ICD-10-CM

## 2019-01-10 DIAGNOSIS — M54.2 CERVICALGIA: ICD-10-CM

## 2019-01-10 DIAGNOSIS — Z82.49 FAMILY HISTORY OF ISCHEMIC HEART DISEASE AND OTHER DISEASES OF THE CIRCULATORY SYSTEM: ICD-10-CM

## 2019-01-10 DIAGNOSIS — Z83.3 FAMILY HISTORY OF DIABETES MELLITUS: ICD-10-CM

## 2019-01-10 DIAGNOSIS — Z78.9 OTHER SPECIFIED HEALTH STATUS: ICD-10-CM

## 2019-01-10 DIAGNOSIS — R42 DIZZINESS AND GIDDINESS: ICD-10-CM

## 2019-01-10 PROCEDURE — 99203 OFFICE O/P NEW LOW 30 MIN: CPT

## 2019-01-10 PROCEDURE — 92540 BASIC VESTIBULAR EVALUATION: CPT

## 2019-01-10 PROCEDURE — 99204 OFFICE O/P NEW MOD 45 MIN: CPT

## 2019-01-10 PROCEDURE — 92537 CALORIC VSTBLR TEST W/REC: CPT

## 2019-01-10 RX ORDER — LOSARTAN POTASSIUM 100 MG/1
100 TABLET, FILM COATED ORAL
Qty: 90 | Refills: 0 | Status: ACTIVE | COMMUNITY
Start: 2018-06-30

## 2019-01-10 RX ORDER — OMEPRAZOLE 40 MG/1
40 CAPSULE, DELAYED RELEASE ORAL
Qty: 30 | Refills: 0 | Status: ACTIVE | COMMUNITY
Start: 2018-10-01

## 2019-01-10 RX ORDER — ALPRAZOLAM 0.25 MG/1
0.25 TABLET ORAL
Qty: 5 | Refills: 0 | Status: ACTIVE | COMMUNITY
Start: 2018-09-10

## 2019-01-10 RX ORDER — NAPROXEN 500 MG/1
500 TABLET ORAL
Qty: 60 | Refills: 0 | Status: ACTIVE | COMMUNITY
Start: 2018-09-22

## 2019-01-10 RX ORDER — NAPROXEN SODIUM 220 MG
TABLET ORAL
Refills: 0 | Status: ACTIVE | COMMUNITY

## 2019-01-10 RX ORDER — METRONIDAZOLE 500 MG/1
500 TABLET ORAL
Qty: 42 | Refills: 0 | Status: ACTIVE | COMMUNITY
Start: 2018-11-01

## 2019-01-10 RX ORDER — LOSARTAN POTASSIUM 50 MG/1
50 TABLET, FILM COATED ORAL
Qty: 180 | Refills: 0 | Status: ACTIVE | COMMUNITY
Start: 2018-12-20

## 2019-01-10 RX ORDER — CANDESARTAN CILEXETIL 4 MG/1
TABLET ORAL
Refills: 0 | Status: ACTIVE | COMMUNITY

## 2019-01-10 RX ORDER — SERTRALINE 25 MG/1
25 TABLET, FILM COATED ORAL
Qty: 15 | Refills: 0 | Status: ACTIVE | COMMUNITY
Start: 2018-11-01

## 2019-01-10 RX ORDER — CIPROFLOXACIN HYDROCHLORIDE 500 MG/1
500 TABLET, FILM COATED ORAL
Qty: 30 | Refills: 0 | Status: ACTIVE | COMMUNITY
Start: 2018-11-01

## 2019-01-10 RX ORDER — MECLIZINE HYDROCHLORIDE 25 MG/1
25 TABLET ORAL
Qty: 20 | Refills: 0 | Status: ACTIVE | COMMUNITY
Start: 2018-12-27

## 2019-01-10 RX ORDER — ASPIRIN 81 MG
81 TABLET, DELAYED RELEASE (ENTERIC COATED) ORAL
Refills: 0 | Status: ACTIVE | COMMUNITY

## 2019-01-10 NOTE — HISTORY OF PRESENT ILLNESS
[de-identified] : 47 yo man with h/o cardiac arrest early last yr; had emergency bypass and did well. Over ensuing months he developed a "dipping" sensation in his head or loss of balance. He has had one episode of spinning vertigo which lasted 12 hrs. has had a complete workup and referred by Darius Rodriguez. 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 He denies hl or tinnitus. Referred for a vng. Initially he was too hypertensive for vng but after speaking with him and watching him, his bp decreased to a safer level. He will discuss his bp with his cardiologist, but vng was performed.

## 2019-03-13 ENCOUNTER — OUTPATIENT (OUTPATIENT)
Dept: OUTPATIENT SERVICES | Facility: HOSPITAL | Age: 49
LOS: 1 days | End: 2019-03-13

## 2019-03-13 DIAGNOSIS — Z98.890 OTHER SPECIFIED POSTPROCEDURAL STATES: Chronic | ICD-10-CM

## 2019-03-13 DIAGNOSIS — S86.012A STRAIN OF LEFT ACHILLES TENDON, INITIAL ENCOUNTER: Chronic | ICD-10-CM

## 2019-03-13 DIAGNOSIS — I10 ESSENTIAL (PRIMARY) HYPERTENSION: ICD-10-CM

## 2019-03-13 DIAGNOSIS — R42 DIZZINESS AND GIDDINESS: ICD-10-CM

## 2019-04-29 ENCOUNTER — APPOINTMENT (OUTPATIENT)
Dept: OPHTHALMOLOGY | Facility: CLINIC | Age: 49
End: 2019-04-29
Payer: COMMERCIAL

## 2019-04-29 DIAGNOSIS — H81.392 OTHER PERIPHERAL VERTIGO, LEFT EAR: ICD-10-CM

## 2019-04-29 DIAGNOSIS — H43.393 OTHER VITREOUS OPACITIES, BILATERAL: ICD-10-CM

## 2019-04-29 DIAGNOSIS — R42 DIZZINESS AND GIDDINESS: ICD-10-CM

## 2019-04-29 PROCEDURE — 99244 OFF/OP CNSLTJ NEW/EST MOD 40: CPT

## 2019-04-29 PROCEDURE — 92134 CPTRZ OPH DX IMG PST SGM RTA: CPT

## 2019-09-03 NOTE — DISCHARGE NOTE ADULT - NSFTFCONTACT3DT_GEN_ALL_CORE
SUBJECTIVE:   54 yo white female is here for follow-up on partial toe amputation of the right great toe on 6/8/19.  She has remained in physical therapy and is making slow but steady progress. She is still having a considerable amount of problem with the balance. And a lot of pain when she is doing much in the way of standing or walking.    I think at this point the stump has hardened enough and the edema has defervesced that he could be fitted for custom made orthotics for her shoe    She cannot go back to her work which is a standing a  that does use foot pedals until we can resolve the pain issue.  It may be possible for her to get sitdown jobs and jobs that do not require the use of repetitive foot pedals. And if that is possible then we could have her return to work    There has been improvement in the last week in the sensitivity to light touch    OBJECTIVE:  General:   Patient is alert and in no distress  Vitals:   Visit Vitals  /80 (BP Location: [facility], Patient Position: Sitting, Cuff Size: Regular)   Pulse 94   Temp 98.1 °F (36.7 °C) (Tympanic)   Wt 64.7 kg   LMP 08/14/2013   BMI 24.11 kg/m²     The stump has hardened the skin is intact there is no open areas  And surprisingly the nail is growing out    Today I'm able to touch lightly without the patient having such sensitivity as she has in the past       ASSESSMENT/PLAN:   Partial amputation of the right great toe distal phalanx-    physical therapy    Referral to orthopedics patient is probably going to need to have custom made orthotics in order to tolerate such a standing job.    In the interim we have reviewed her going back to work on 9/9/19 with restrictions. And those restrictions would include sitdown work only no use of repetitive foot pedals    If these restrictions cannot be met then she will not be able to return at this time    Follow-up with me will be in 2 weeks   15-Feb-2018

## 2019-10-25 ENCOUNTER — OUTPATIENT (OUTPATIENT)
Dept: OUTPATIENT SERVICES | Facility: HOSPITAL | Age: 49
LOS: 1 days | End: 2019-10-25
Payer: COMMERCIAL

## 2019-10-25 ENCOUNTER — TRANSCRIPTION ENCOUNTER (OUTPATIENT)
Age: 49
End: 2019-10-25

## 2019-10-25 ENCOUNTER — APPOINTMENT (OUTPATIENT)
Dept: CT IMAGING | Facility: HOSPITAL | Age: 49
End: 2019-10-25
Payer: COMMERCIAL

## 2019-10-25 DIAGNOSIS — Z98.890 OTHER SPECIFIED POSTPROCEDURAL STATES: Chronic | ICD-10-CM

## 2019-10-25 DIAGNOSIS — S86.012A STRAIN OF LEFT ACHILLES TENDON, INITIAL ENCOUNTER: Chronic | ICD-10-CM

## 2019-10-25 PROCEDURE — 75574 CT ANGIO HRT W/3D IMAGE: CPT

## 2019-10-25 PROCEDURE — 75574 CT ANGIO HRT W/3D IMAGE: CPT | Mod: 26

## 2019-11-25 ENCOUNTER — OUTPATIENT (OUTPATIENT)
Dept: OUTPATIENT SERVICES | Facility: HOSPITAL | Age: 49
LOS: 1 days | End: 2019-11-25

## 2019-11-25 ENCOUNTER — APPOINTMENT (OUTPATIENT)
Dept: MRI IMAGING | Facility: CLINIC | Age: 49
End: 2019-11-25
Payer: COMMERCIAL

## 2019-11-25 DIAGNOSIS — Z98.890 OTHER SPECIFIED POSTPROCEDURAL STATES: Chronic | ICD-10-CM

## 2019-11-25 DIAGNOSIS — S86.012A STRAIN OF LEFT ACHILLES TENDON, INITIAL ENCOUNTER: Chronic | ICD-10-CM

## 2019-11-25 PROCEDURE — 75565 CARD MRI VELOC FLOW MAPPING: CPT | Mod: 26

## 2019-11-25 PROCEDURE — 75561 CARDIAC MRI FOR MORPH W/DYE: CPT | Mod: 26

## 2019-11-27 VITALS
HEART RATE: 56 BPM | DIASTOLIC BLOOD PRESSURE: 87 MMHG | TEMPERATURE: 99 F | SYSTOLIC BLOOD PRESSURE: 142 MMHG | OXYGEN SATURATION: 98 % | RESPIRATION RATE: 16 BRPM

## 2019-11-27 NOTE — H&P ADULT - NSICDXPASTSURGICALHX_GEN_ALL_CORE_FT
PAST SURGICAL HISTORY:  Achilles rupture, left s/p repair    H/O umbilical hernia repair     History of appendectomy

## 2019-11-27 NOTE — H&P ADULT - RS GEN PE MLT RESP DETAILS PC
no chest wall tenderness/respirations non-labored/clear to auscultation bilaterally/normal/breath sounds equal/good air movement

## 2019-11-27 NOTE — H&P ADULT - NSHPSOCIALHISTORY_GEN_ALL_CORE
Former smoker. Quit 2005. Used to smoke Former social smoker. Social ETOH Use. Denies Illicit Drug Use

## 2019-11-27 NOTE — H&P ADULT - ASSESSMENT
49 year old M former social smoker with FHx SCD (Cousin-40s) and PMHx HTN, Hyperlipidemia, CAD s/p Cardiac Arrest treated with CPR, Defibrillation, and Amiodarone Drip with subsequent s/p 3V CABG (CINTHYA-LAD, LIMA-OM, Radial Graft to RPDA) 2/12/2018 at [facility] by Dr. Nichole , Enhanced External Counter Pulsation Treatment 9/2013-11/2013, Diverticulitis (with recent flare secondary to ASA treated with 1 week course of Cipro and Flagyl -last dose 11/19; patient has been off ASA x 2-3 weeks as a result) who presented to cardiologist c/o C/P at rest. Patient underwent Cardiac CTA and Cardiac MRI which were abnormal. In light of patient's risk factors, CCS Angina Class IV Symptoms and abnormal CTA and Cardiac MRI patient now presents for cardiac cath with probable intervention of CINTHYA-LAD graft.    -H/H = 15.7/48 -- Patient admits that he has been off 81mg ASA daily for 2-3wks as a result of his recent diverticulitis flare up. Dr. Clement aware and elected to NOT LOAD the patient prior to the procedure.  -Cr = ___. EF 63%. Euvolemic on exam. IVF NS @ 77cc/hr x 4 hrs started pre procedure.  -Type of sedation: moderate  Candidate for sedation: yes    Risks & benefits of procedure and alternative therapy have been explained to the patient including but not limited to: allergic reaction, bleeding w/possible need for blood transfusion, infection, renal and vascular compromise, limb damage, arrhythmia, stroke, vessel dissection/perforation, Myocardial infarction, emergent CABG. Informed consent obtained and in chart. 49 year old M former social smoker with FHx SCD (Cousin-40s) and PMHx HTN, Hyperlipidemia, CAD s/p Cardiac Arrest treated with CPR, Defibrillation, and Amiodarone Drip with subsequent s/p 3V CABG (CINTHYA-LAD, LIMA-OM, Radial Graft to RPDA) 2/12/2018 at [facility] by Dr. Nichole , Enhanced External Counter Pulsation Treatment 9/2013-11/2013, Diverticulitis (with recent flare secondary to ASA treated with 1 week course of Cipro and Flagyl -last dose 11/19; patient has been off ASA x 2-3 weeks as a result) who presented to cardiologist c/o C/P at rest. Patient underwent Cardiac CTA and Cardiac MRI which were abnormal. In light of patient's risk factors, CCS Angina Class IV Symptoms and abnormal CTA and Cardiac MRI patient now presents for cardiac cath with probable intervention of CINTHYA-LAD graft.    -H/H = 15.7/48 -- Patient admits that he has been off 81mg ASA daily for 2-3wks as a result of his recent diverticulitis flare up. Dr. Clement aware and elected to NOT LOAD the patient prior to the procedure.  -Potassium hemolyzed. ISTAT clotted as well. STAT BMP sent to lab pre procedure. Fellow aware.  -Cr = 0.85. EF 63%. Euvolemic on exam. IVF NS @ 77cc/hr x 4 hrs started pre procedure.  -Type of sedation: moderate  Candidate for sedation: yes    Risks & benefits of procedure and alternative therapy have been explained to the patient including but not limited to: allergic reaction, bleeding w/possible need for blood transfusion, infection, renal and vascular compromise, limb damage, arrhythmia, stroke, vessel dissection/perforation, Myocardial infarction, emergent CABG. Informed consent obtained and in chart. 49 year old M former social smoker with FHx SCD (Cousin-40s) and PMHx HTN, Hyperlipidemia, CAD s/p Cardiac Arrest treated with CPR, Defibrillation, and Amiodarone Drip with subsequent s/p 3V CABG (CINTHYA-LAD, LIMA-OM, Radial Graft to RPDA) 2/12/2018 at [facility] by Dr. Nichole , Enhanced External Counter Pulsation Treatment 9/2013-11/2013, Diverticulitis (with recent flare secondary to ASA treated with 1 week course of Cipro and Flagyl -last dose 11/19; patient has been off ASA x 2-3 weeks as a result) who presented to cardiologist c/o C/P at rest. Patient underwent Cardiac CTA and Cardiac MRI which were abnormal. In light of patient's risk factors, CCS Angina Class IV Symptoms and abnormal CTA and Cardiac MRI patient now presents for cardiac cath with probable intervention of CINTHYA-LAD graft.    -H/H = 15.7/48 -- Denies hematuria, BRBPR, hematemesis Patient admits that he has been off 81mg ASA daily for 2-3wks as a result of his recent diverticulitis flare up. Dr. Clement aware and elected to NOT LOAD the patient prior to the procedure.  -Potassium hemolyzed. ISTAT clotted as well. STAT BMP sent to lab pre procedure. Fellow aware.  -Cr = 0.85. EF 63%. Euvolemic on exam. IVF NS @ 77cc/hr x 4 hrs started pre procedure.  -Type of sedation: moderate  Candidate for sedation: yes    Risks & benefits of procedure and alternative therapy have been explained to the patient including but not limited to: allergic reaction, bleeding w/possible need for blood transfusion, infection, renal and vascular compromise, limb damage, arrhythmia, stroke, vessel dissection/perforation, Myocardial infarction, emergent CABG. Informed consent obtained and in chart. 49 year old M former social smoker with FHx SCD (Cousin-40s) and PMHx HTN, Hyperlipidemia, CAD s/p Cardiac Arrest treated with CPR, Defibrillation, and Amiodarone Drip with subsequent s/p 3V CABG (CINTHYA-LAD, LIMA-OM, Radial Graft to RPDA) 2/12/2018 at [facility] by Dr. Nichole , Enhanced External Counter Pulsation Treatment 9/2013-11/2013, Diverticulitis (with recent flare secondary to ASA treated with 1 week course of Cipro and Flagyl -last dose 11/19; patient has been off ASA x 2-3 weeks as a result) who presented to cardiologist c/o C/P at rest. Patient underwent Cardiac CTA and Cardiac MRI which were abnormal. In light of patient's risk factors, CCS Angina Class IV Symptoms and abnormal CTA and Cardiac MRI patient now presents for cardiac cath with probable intervention of CINTHYA-LAD graft.    -H/H = 15.7/48 -- Denies hematuria, BRBPR, hematemesis Patient admits that he has been off 81mg ASA daily for 2-3wks as a result of his recent diverticulitis flare up. Dr. Clement aware and elected to LOAD the patient with 600mg Plavix (NO ASA) prior to the procedure.  -Potassium hemolyzed. ISTAT clotted as well. STAT BMP sent to lab pre procedure. Fellow aware.  -Cr = 0.85. EF 63%. Euvolemic on exam. IVF NS @ 77cc/hr x 4 hrs started pre procedure.  -Type of sedation: moderate  Candidate for sedation: yes    Risks & benefits of procedure and alternative therapy have been explained to the patient including but not limited to: allergic reaction, bleeding w/possible need for blood transfusion, infection, renal and vascular compromise, limb damage, arrhythmia, stroke, vessel dissection/perforation, Myocardial infarction, emergent CABG. Informed consent obtained and in chart.

## 2019-11-27 NOTE — H&P ADULT - NSICDXFAMILYHX_GEN_ALL_CORE_FT
FAMILY HISTORY:  Family history of heart attack    Father  Still living? Unknown  Family history of hypertension, Age at diagnosis: Age Unknown    Mother  Still living? Unknown  Family history of diabetes mellitus, Age at diagnosis: Age Unknown  Family history of hypertension, Age at diagnosis: Age Unknown

## 2019-11-27 NOTE — H&P ADULT - HISTORY OF PRESENT ILLNESS
49 year old M former smoker with FHx SCD (Cousin-40s) and PMHx HTN, Hyperlipidemia, CAD s/p Cardiac Arrest treated with CPR, Defibrillation, and Amiodarone Drip with subsequent s/p 3V CABG (CINTHYA-LAD, LIMA-OM, Radial Graft to RPDA) 2/12/2018 at [facility] by Dr. Nichole , Enhanced External Counter Pulsation Treatment 9/2013-11/2013  Cardiac CTA 10/25/19 revealed CINTHYA to distal LAD occluded at distal touchdown, patent LIMA to OM, patent Arterial graft to RPDA, severe 3 native 3 vessel disease.   Cardiac MRI 11/25/19 revealed LVEF 63%, no LVH, RVEF 63%, mild AR, subendocardial delayed enhancement in the basal to mid anterolateral wall, mid anterior and mid inferolateral wall. This pattern of delayed enhancement is ischemic in nature and consistent with viable myocardium.    Echo 2/9/18: The left atrium is mildly dilated. Right atrial size is normal.There is   mild aortic valve thickening. No aortic regurgitation noted. No hemodynamically   significant valvular aortic stenosis.  Structurally normal mitral valve.   There is trace mitral regurgitation. Structurally normal tricuspid valve. There was   insufficient TR detected from which to calculate pulmonary artery systolic   pressure.  The pulmonic valve is not well visualized. The right ventricle is   borderline dilated. The right ventricular systolic function is normal.    Normal left ventricular size and wall thickness. The left ventricular wall   motion is normal. The left ventricular ejection fraction is 63%.  No aortic root  dilatation. There is no pericardial effusion. No prior study for   comparison. 49 year old M former smoker with FHx SCD (Cousin-40s) and PMHx HTN, Hyperlipidemia, CAD s/p Cardiac Arrest treated with CPR, Defibrillation, and Amiodarone Drip with subsequent s/p 3V CABG (CINTHYA-LAD, LIMA-OM, Radial Graft to RPDA) 2/12/2018 at [facility] by Dr. Nichole , Enhanced External Counter Pulsation Treatment 9/2013-11/2013, Diverticulitis (with recent flare secondary to NSAIDS-treated with 1 week course of Cipro and Flagyl -last dose 11/19) who presented to cardiologist c/o C/P at rest while driving watching tv.   C/P described as non-radiating squeezing lasting a second. Patient denies SOB, nausea, vomiting, diaphoresis, palpitations, dizziness, syncope. LE edema, PND or orthopnea.   [place] ER stayed overnight had stress test done next day. Patient reports enzymes were elevated to 0.04.   now dull pain left sided intermittent.  Cardiac CTA 10/25/19 revealed CINTHYA to distal LAD occluded at distal touchdown, patent LIMA to OM, patent Arterial graft to RPDA, severe 3 native 3 vessel disease.   Cardiac MRI 11/25/19 revealed LVEF 63%, no LVH, RVEF 63%, mild AR, subendocardial delayed enhancement in the basal to mid anterolateral wall, mid anterior and mid inferolateral wall. This pattern of delayed enhancement is ischemic in nature and consistent with viable myocardium.    Echo 2/9/18: The left atrium is mildly dilated. Right atrial size is normal.There is   mild aortic valve thickening. No aortic regurgitation noted. No hemodynamically   significant valvular aortic stenosis.  Structurally normal mitral valve.   There is trace mitral regurgitation. Structurally normal tricuspid valve. There was   insufficient TR detected from which to calculate pulmonary artery systolic   pressure.  The pulmonic valve is not well visualized. The right ventricle is   borderline dilated. The right ventricular systolic function is normal.    Normal left ventricular size and wall thickness. The left ventricular wall   motion is normal. The left ventricular ejection fraction is 63%.  No aortic root  dilatation. There is no pericardial effusion. No prior study for   comparison. PATIENT WILL BRING MEDS PLEASE UPDATE  49 year old M former social smoker with FHx SCD (Cousin-40s) and PMHx HTN, Hyperlipidemia, CAD s/p Cardiac Arrest treated with CPR, Defibrillation, and Amiodarone Drip with subsequent s/p 3V CABG (CINTHYA-LAD, LIMA-OM, Radial Graft to RPDA) 2/12/2018 at [facility] by Dr. Nichole , Enhanced External Counter Pulsation Treatment 9/2013-11/2013, Diverticulitis (with recent flare secondary to NSAIDS-treated with 1 week course of Cipro and Flagyl -last dose 11/19) who presented to cardiologist c/o C/P at rest. C/P occured at rest while driving described as non-radiating left sided squeezing lasting a second before resolving. Patient denies SOB, nausea, vomiting, diaphoresis, palpitations, dizziness, syncope. LE edema, PND or orthopnea. Patient presented to [place] ER and stayed overnight and underwent a stress test that was abnormal. Patient reports enzymes were elevated to 0.04. C/P continues to occur at rest however is now more dull in nature   Cardiac CTA 10/25/19 revealed CINTHYA to distal LAD occluded at distal touchdown, patent LIMA to OM, patent Arterial graft to RPDA, severe native 3 vessel disease.   Cardiac MRI 11/25/19 revealed LVEF 63%, no LVH, RVEF 63%, mild AR, subendocardial delayed enhancement in the basal to mid anterolateral wall, mid anterior and mid inferolateral wall. This pattern of delayed enhancement is ischemic in nature and consistent with viable myocardium.  In light of patient's risk factors, CCS Angina Class IV Symptoms and abnormal CTA and Cardiac MRI patient now presents for cardiac cath with probable intervention of CINTHYA-LAD graft.     Echo 2/9/18: The left atrium is mildly dilated. Right atrial size is normal.There is   mild aortic valve thickening. No aortic regurgitation noted. No hemodynamically   significant valvular aortic stenosis.  Structurally normal mitral valve.   There is trace mitral regurgitation. Structurally normal tricuspid valve. There was   insufficient TR detected from which to calculate pulmonary artery systolic   pressure.  The pulmonic valve is not well visualized. The right ventricle is   borderline dilated. The right ventricular systolic function is normal.    Normal left ventricular size and wall thickness. The left ventricular wall   motion is normal. The left ventricular ejection fraction is 63%.  No aortic root  dilatation. There is no pericardial effusion. No prior study for   comparison. PATIENT REPORTS RECENT DIVERTICULITIS FLARE SECONDARY TO ASA -HAS BEEN OFF ASA X 2-3 WEEKS. PATIENT REPORTS EXPERIENCING FLARES WITH NSAIDS. -DR. TALLEY AWARE. PATIENT WILL LIKELY BE LOADED WITH [place] OR EFFIENT AND TAKE AS MONOTHERAPY GIVEN INABILITY TO TOLERATE ASPIRIN-PLEASE DISCUSS WITH DR. TALLEY  PATIENT WILL BRING MEDS PLEASE UPDATE  49 year old M former social smoker with FHx SCD (Cousin-40s) and PMHx HTN, Hyperlipidemia, CAD s/p Cardiac Arrest treated with CPR, Defibrillation, and Amiodarone Drip with subsequent s/p 3V CABG (CINTHYA-LAD, LIMA-OM, Radial Graft to RPDA) 2/12/2018 at [facility] by Dr. Nichole , Enhanced External Counter Pulsation Treatment 9/2013-11/2013, Diverticulitis (with recent flare secondary to ASA treated with 1 week course of Cipro and Flagyl -last dose 11/19; patient has been off ASA x 2-3 weeks as a result) who presented to cardiologist c/o C/P at rest. C/P occured at rest while driving described as non-radiating left sided squeezing lasting a second before resolving. Patient denies SOB, nausea, vomiting, diaphoresis, palpitations, dizziness, syncope. LE edema, PND or orthopnea. Patient presented to [place] ER and stayed overnight and underwent a stress test that was abnormal. Patient reports enzymes were elevated to 0.04. C/P continues to occur at rest however is now more dull in nature   Cardiac CTA 10/25/19 revealed CINTHYA to distal LAD occluded at distal touchdown, patent LIMA to OM, patent Arterial graft to RPDA, severe native 3 vessel disease.   Cardiac MRI 11/25/19 revealed LVEF 63%, no LVH, RVEF 63%, mild AR, subendocardial delayed enhancement in the basal to mid anterolateral wall, mid anterior and mid inferolateral wall. This pattern of delayed enhancement is ischemic in nature and consistent with viable myocardium.  In light of patient's risk factors, CCS Angina Class IV Symptoms and abnormal CTA and Cardiac MRI patient now presents for cardiac cath with probable intervention of CINTHYA-LAD graft.   Cardiac MRI 3/13/18 revealed LVEF and RVEF 51%, no LVH, no significant valvular abnormalities, On delayed enhancement imaging, subendocardial enhancement in the basal anterolateral wall and apical lateral wall.  Additionally, there is diffuse enhancement of the pericardium.    Echo 2/9/18: The left atrium is mildly dilated. Right atrial size is normal.There is   mild aortic valve thickening. No aortic regurgitation noted. No hemodynamically   significant valvular aortic stenosis.  Structurally normal mitral valve.   There is trace mitral regurgitation. Structurally normal tricuspid valve. There was   insufficient TR detected from which to calculate pulmonary artery systolic   pressure.  The pulmonic valve is not well visualized. The right ventricle is   borderline dilated. The right ventricular systolic function is normal.    Normal left ventricular size and wall thickness. The left ventricular wall   motion is normal. The left ventricular ejection fraction is 63%.  No aortic root  dilatation. There is no pericardial effusion. No prior study for   comparison. 49 year old M former social smoker with FHx SCD (Cousin-40s) and PMHx HTN, Hyperlipidemia, CAD s/p Cardiac Arrest treated with CPR, Defibrillation, and Amiodarone Drip with subsequent s/p 3V CABG (CINTHYA-LAD, LIMA-OM, Radial Graft to RPDA) 2/12/2018 at [facility] by Dr. Nichole , Enhanced External Counter Pulsation Treatment 9/2013-11/2013, Diverticulitis (with recent flare secondary to ASA treated with 1 week course of Cipro and Flagyl -last dose 11/19; patient has been off ASA x 2-3 weeks as a result) who presented to cardiologist c/o C/P at rest. C/P occured at rest while driving described as non-radiating left sided squeezing lasting a second before resolving. Patient denies SOB, nausea, vomiting, diaphoresis, palpitations, dizziness, syncope. LE edema, PND or orthopnea. Patient presented to [place] ER and stayed overnight and underwent a stress test that was abnormal. Patient reports enzymes were elevated to 0.04. C/P continues to occur at rest however is now more dull in nature   Cardiac CTA 10/25/19 revealed CINTHYA to distal LAD occluded at distal touchdown, patent LIMA to OM, patent Arterial graft to RPDA, severe native 3 vessel disease.   Cardiac MRI 11/25/19 revealed LVEF 63%, no LVH, RVEF 63%, mild AR, subendocardial delayed enhancement in the basal to mid anterolateral wall, mid anterior and mid inferolateral wall. This pattern of delayed enhancement is ischemic in nature and consistent with viable myocardium.  In light of patient's risk factors, CCS Angina Class IV Symptoms and abnormal CTA and Cardiac MRI patient now presents for cardiac cath with probable intervention of CINTHYA-LAD graft.   Cardiac MRI 3/13/18 revealed LVEF and RVEF 51%, no LVH, no significant valvular abnormalities, On delayed enhancement imaging, subendocardial enhancement in the basal anterolateral wall and apical lateral wall.  Additionally, there is diffuse enhancement of the pericardium.    Echo 2/9/18: The left atrium is mildly dilated. Right atrial size is normal.There is   mild aortic valve thickening. No aortic regurgitation noted. No hemodynamically   significant valvular aortic stenosis.  Structurally normal mitral valve.   There is trace mitral regurgitation. Structurally normal tricuspid valve. There was   insufficient TR detected from which to calculate pulmonary artery systolic   pressure.  The pulmonic valve is not well visualized. The right ventricle is   borderline dilated. The right ventricular systolic function is normal.    Normal left ventricular size and wall thickness. The left ventricular wall   motion is normal. The left ventricular ejection fraction is 63%.  No aortic root  dilatation. There is no pericardial effusion. No prior study for   comparison.

## 2019-12-02 ENCOUNTER — OUTPATIENT (OUTPATIENT)
Dept: OUTPATIENT SERVICES | Facility: HOSPITAL | Age: 49
LOS: 1 days | Discharge: NURSING, REHAB OR LONG-TERM CARE | End: 2019-12-02
Payer: COMMERCIAL

## 2019-12-02 DIAGNOSIS — Z98.890 OTHER SPECIFIED POSTPROCEDURAL STATES: Chronic | ICD-10-CM

## 2019-12-02 DIAGNOSIS — S86.012A STRAIN OF LEFT ACHILLES TENDON, INITIAL ENCOUNTER: Chronic | ICD-10-CM

## 2019-12-02 LAB
ALBUMIN SERPL ELPH-MCNC: 4.1 G/DL — SIGNIFICANT CHANGE UP (ref 3.3–5)
ALP SERPL-CCNC: 82 U/L — SIGNIFICANT CHANGE UP (ref 40–120)
ALT FLD-CCNC: SIGNIFICANT CHANGE UP U/L (ref 10–45)
ANION GAP SERPL CALC-SCNC: 10 MMOL/L — SIGNIFICANT CHANGE UP (ref 5–17)
APTT BLD: 36.5 SEC — HIGH (ref 27.5–36.3)
AST SERPL-CCNC: SIGNIFICANT CHANGE UP U/L (ref 10–40)
BASOPHILS # BLD AUTO: 0.04 K/UL — SIGNIFICANT CHANGE UP (ref 0–0.2)
BASOPHILS NFR BLD AUTO: 0.8 % — SIGNIFICANT CHANGE UP (ref 0–2)
BILIRUB SERPL-MCNC: 0.6 MG/DL — SIGNIFICANT CHANGE UP (ref 0.2–1.2)
BUN SERPL-MCNC: 15 MG/DL — SIGNIFICANT CHANGE UP (ref 7–23)
CALCIUM SERPL-MCNC: 9.2 MG/DL — SIGNIFICANT CHANGE UP (ref 8.4–10.5)
CHLORIDE SERPL-SCNC: 107 MMOL/L — SIGNIFICANT CHANGE UP (ref 96–108)
CHOLEST SERPL-MCNC: 195 MG/DL — SIGNIFICANT CHANGE UP (ref 10–199)
CK MB CFR SERPL CALC: 2.4 NG/ML — SIGNIFICANT CHANGE UP (ref 0–6.7)
CK SERPL-CCNC: 313 U/L — HIGH (ref 30–200)
CO2 SERPL-SCNC: 24 MMOL/L — SIGNIFICANT CHANGE UP (ref 22–31)
CREAT SERPL-MCNC: 0.85 MG/DL — SIGNIFICANT CHANGE UP (ref 0.5–1.3)
CRP SERPL-MCNC: 0.03 MG/DL — SIGNIFICANT CHANGE UP (ref 0–0.4)
EOSINOPHIL # BLD AUTO: 0.13 K/UL — SIGNIFICANT CHANGE UP (ref 0–0.5)
EOSINOPHIL NFR BLD AUTO: 2.5 % — SIGNIFICANT CHANGE UP (ref 0–6)
GLUCOSE SERPL-MCNC: 135 MG/DL — HIGH (ref 70–99)
HBA1C BLD-MCNC: 6.3 % — HIGH (ref 4–5.6)
HCT VFR BLD CALC: 48 % — SIGNIFICANT CHANGE UP (ref 39–50)
HDLC SERPL-MCNC: 40 MG/DL — SIGNIFICANT CHANGE UP
HGB BLD-MCNC: 15.7 G/DL — SIGNIFICANT CHANGE UP (ref 13–17)
IMM GRANULOCYTES NFR BLD AUTO: 0.2 % — SIGNIFICANT CHANGE UP (ref 0–1.5)
INR BLD: 1.04 — SIGNIFICANT CHANGE UP (ref 0.88–1.16)
LIPID PNL WITH DIRECT LDL SERPL: 139 MG/DL — HIGH
LYMPHOCYTES # BLD AUTO: 1.62 K/UL — SIGNIFICANT CHANGE UP (ref 1–3.3)
LYMPHOCYTES # BLD AUTO: 31.2 % — SIGNIFICANT CHANGE UP (ref 13–44)
MCHC RBC-ENTMCNC: 28.9 PG — SIGNIFICANT CHANGE UP (ref 27–34)
MCHC RBC-ENTMCNC: 32.7 GM/DL — SIGNIFICANT CHANGE UP (ref 32–36)
MCV RBC AUTO: 88.4 FL — SIGNIFICANT CHANGE UP (ref 80–100)
MONOCYTES # BLD AUTO: 0.55 K/UL — SIGNIFICANT CHANGE UP (ref 0–0.9)
MONOCYTES NFR BLD AUTO: 10.6 % — SIGNIFICANT CHANGE UP (ref 2–14)
NEUTROPHILS # BLD AUTO: 2.84 K/UL — SIGNIFICANT CHANGE UP (ref 1.8–7.4)
NEUTROPHILS NFR BLD AUTO: 54.7 % — SIGNIFICANT CHANGE UP (ref 43–77)
NRBC # BLD: 0 /100 WBCS — SIGNIFICANT CHANGE UP (ref 0–0)
PLATELET # BLD AUTO: 274 K/UL — SIGNIFICANT CHANGE UP (ref 150–400)
POTASSIUM SERPL-MCNC: SIGNIFICANT CHANGE UP MMOL/L (ref 3.5–5.3)
POTASSIUM SERPL-SCNC: SIGNIFICANT CHANGE UP MMOL/L (ref 3.5–5.3)
PROT SERPL-MCNC: 7.1 G/DL — SIGNIFICANT CHANGE UP (ref 6–8.3)
PROTHROM AB SERPL-ACNC: 11.8 SEC — SIGNIFICANT CHANGE UP (ref 10–12.9)
RBC # BLD: 5.43 M/UL — SIGNIFICANT CHANGE UP (ref 4.2–5.8)
RBC # FLD: 13.2 % — SIGNIFICANT CHANGE UP (ref 10.3–14.5)
SODIUM SERPL-SCNC: 141 MMOL/L — SIGNIFICANT CHANGE UP (ref 135–145)
TOTAL CHOLESTEROL/HDL RATIO MEASUREMENT: 4.9 RATIO — SIGNIFICANT CHANGE UP (ref 3.4–9.6)
TRIGL SERPL-MCNC: 78 MG/DL — SIGNIFICANT CHANGE UP (ref 10–149)
WBC # BLD: 5.19 K/UL — SIGNIFICANT CHANGE UP (ref 3.8–10.5)
WBC # FLD AUTO: 5.19 K/UL — SIGNIFICANT CHANGE UP (ref 3.8–10.5)

## 2019-12-02 PROCEDURE — 85025 COMPLETE CBC W/AUTO DIFF WBC: CPT

## 2019-12-02 PROCEDURE — C1760: CPT

## 2019-12-02 PROCEDURE — 83036 HEMOGLOBIN GLYCOSYLATED A1C: CPT

## 2019-12-02 PROCEDURE — C1889: CPT

## 2019-12-02 PROCEDURE — 80061 LIPID PANEL: CPT

## 2019-12-02 PROCEDURE — 93005 ELECTROCARDIOGRAM TRACING: CPT

## 2019-12-02 PROCEDURE — 85610 PROTHROMBIN TIME: CPT

## 2019-12-02 PROCEDURE — C1894: CPT

## 2019-12-02 PROCEDURE — C1887: CPT

## 2019-12-02 PROCEDURE — 93571 IV DOP VEL&/PRESS C FLO 1ST: CPT | Mod: 26,LD

## 2019-12-02 PROCEDURE — C1769: CPT

## 2019-12-02 PROCEDURE — 82550 ASSAY OF CK (CPK): CPT

## 2019-12-02 PROCEDURE — 93459 L HRT ART/GRFT ANGIO: CPT | Mod: 26

## 2019-12-02 PROCEDURE — 93459 L HRT ART/GRFT ANGIO: CPT

## 2019-12-02 PROCEDURE — 93010 ELECTROCARDIOGRAM REPORT: CPT

## 2019-12-02 PROCEDURE — 85730 THROMBOPLASTIN TIME PARTIAL: CPT

## 2019-12-02 PROCEDURE — 82553 CREATINE MB FRACTION: CPT

## 2019-12-02 PROCEDURE — 86140 C-REACTIVE PROTEIN: CPT

## 2019-12-02 PROCEDURE — 80053 COMPREHEN METABOLIC PANEL: CPT

## 2019-12-02 RX ORDER — SIMVASTATIN 20 MG/1
1 TABLET, FILM COATED ORAL
Qty: 0 | Refills: 0 | DISCHARGE

## 2019-12-02 RX ORDER — SODIUM CHLORIDE 9 MG/ML
500 INJECTION INTRAMUSCULAR; INTRAVENOUS; SUBCUTANEOUS
Refills: 0 | Status: DISCONTINUED | OUTPATIENT
Start: 2019-12-02 | End: 2019-12-02

## 2019-12-02 RX ORDER — LOSARTAN POTASSIUM 100 MG/1
1 TABLET, FILM COATED ORAL
Qty: 0 | Refills: 0 | DISCHARGE

## 2019-12-02 RX ORDER — CLOPIDOGREL BISULFATE 75 MG/1
600 TABLET, FILM COATED ORAL ONCE
Refills: 0 | Status: COMPLETED | OUTPATIENT
Start: 2019-12-02 | End: 2019-12-02

## 2019-12-02 RX ORDER — CHLORHEXIDINE GLUCONATE 213 G/1000ML
1 SOLUTION TOPICAL ONCE
Refills: 0 | Status: DISCONTINUED | OUTPATIENT
Start: 2019-12-02 | End: 2019-12-02

## 2019-12-02 RX ADMIN — SODIUM CHLORIDE 75 MILLILITER(S): 9 INJECTION INTRAMUSCULAR; INTRAVENOUS; SUBCUTANEOUS at 08:49

## 2019-12-02 RX ADMIN — CLOPIDOGREL BISULFATE 600 MILLIGRAM(S): 75 TABLET, FILM COATED ORAL at 08:54

## 2019-12-02 NOTE — CONSULT NOTE ADULT - SUBJECTIVE AND OBJECTIVE BOX
Preventive Cardiology Consultation Note    Cardiologist - Dr. Monge     CHIEF COMPLAINT: s/p cardiac catheterization requiring cardiovascular prevention optimization and education    HISTORY OF PRESENT ILLNESS: 49 year old M former social smoker with FHx SCD (Cousin-40s) and PMHx HTN, Hyperlipidemia, CAD s/p Cardiac Arrest treated with CPR, Defibrillation, and Amiodarone Drip with subsequent s/p 3V CABG (CINTHYA-LAD, LIMA-OM, Radial Graft to RPDA) 2/12/2018 at [facility] by Dr. Nichole, Enhanced External Counter Pulsation Treatment 9/2013-11/2013, and diverticulitis. Patient is now s/p dx cardiac cath on 12/2/19: 3VCAD; pLCx 80%, OM1 100% fills via RCA, OM2 90% fills via LIMA, pLAD 70-80% (FFR 0.90, negative) fills via CINTHYA, pRCA 80%, RPDA 100% fills via RAD, patent LIMA-OM, patent RAD-RPDA, patent but ectatic CINTHYA-LAD,  normal LV function, EF 60s, no AS, no MR.    Review of systems otherwise negative.     Lifestyle History:  Mediterranean Diet Score (9 question survey) was 5.   (8-9: optimal, 6-7: near-optimal, 4-5: suboptimal, 0-3: markedly suboptimal)  Exercise: Patient denies any regular exercise other than walking necessary for daily activities.   Smoking: Former social smoker  Stress: Patient denies any stress.     PAST MEDICAL & SURGICAL HISTORY:  HLD (hyperlipidemia)  HTN (hypertension)  CAD (coronary artery disease)  H/O umbilical hernia repair  Achilles rupture, left: s/p repair  History of appendectomy    FAMILY HISTORY:   SCD (Cousin-40s)    Allergies:   No Known Allergies      HOME MEDICATIONS:   losartan 50 mg oral tablet: 1 tab(s) orally 2 times a day (02 Dec 2019 07:22)  propranolol 40 mg oral tablet: 1 tab(s) orally 2 times a day (02 Dec 2019 07:22)  simvastatin 20 mg oral tablet: 1 tab(s) orally once a day (at bedtime) (02 Dec 2019 07:22)      PHYSICAL EXAM:  · Temp (F)	98.6 Degrees F	  · Temp (C)	37 Degrees C	  · Heart Rate	56 /min	  · Respiration Rate (breaths/min)	16 /min	  · BP Systolic	142 mm Hg	  · BP Diastolic	87 mm Hg	  · Blood Pressure - Method	electronic	  · BP Noninvasive Mean	105 mm Hg	  · SpO2 (%)	98 %	  · O2 delivery	room air	    	  Gen- awake, conversive  Head-NCAT; eyes: no corneal arcus noted b/l; no xanthelasmas   Neck- no thyromegaly, no cervical LAD, no JVD, no carotid bruit b/l  Respiratory- good air entry b/l, no WRR  Cardiovascular- S1S2, RRR, no MRG appr, no LE edema b/l, distal pulses 2+ b/l  Gastrointestinal- no HSM, no masses  Neurology- moves all extremities, no focal deficits  Psych- normal affect, non-depressed mood  Skin- no lesions, no rashes, no xanthomas     LABS:	                  15.7   5.19  )-----------( 274      ( 02 Dec 2019 07:03 )             48.0     12-02    141  |  107  |  15  ----------------------------<  135<H>  see  note moderate  hemolysis  observed   |  24  |  0.85    Ca    9.2      02 Dec 2019 07:03    TPro  7.1  /  Alb  4.1  /  TBili  0.6  /  DBili  x   /  AST  see  note moderate  hemolysis  observed  /  ALT  see  note moderate  hemolysis  observed  /  AlkPhos  82  12-02      Hemoglobin A1C, Whole Blood: 6.3 % (12-02 @ 07:03)      Cholesterol, Serum: 195 mg/dL (12-02 @ 07:03)  HDL Cholesterol, Serum: 40 mg/dL (12-02 @ 07:03)  Triglycerides, Serum: 78 mg/dL (12-02 @ 07:03)  Direct LDL: 139 mg/dL (12-02 @ 07:03)    C-Reactive Protein, Serum: 0.03 mg/dL (12-02 @ 07:03)      ASSESSMENT/RECOMMENDATIONS: 	  Patient's dietary, exercise and overall lifestyle habits were reviewed. The concept of atherosclerosis and its systemic nature was discussed with a focus on the need to get all cardiovascular risk factors to goal. At this time, I would like to make the following recommendations to optimize atherosclerotic risk factors.     RECOMMENDATIONS:   Anti-platelet Therapy: APT per interventionalist recommendation.   Lipid Therapy: Patient is currently taking simvastatin 20mg daily and is compliant and tolerating it well.  In general, we recommend the use of atorvastatin or rosuvastatin, if tolerated. We would recommend switching the patient to either of those agents if possible for better lipid control. If that is not feasible, it would be reasonable to increase the current statin dosage and/or add Zetia 10mg daily to further optimize his medication regimen, with the goal of lowering his LDL-C to <70 mg/dL.   Hypertension: Blood pressures during this stay were well-controlled.   Mediterranean Diet Score is 5. Some suggestions include continue incorporating 2 or more servings per day of vegetables, fruits, and whole grains. Increase intake of fish and legumes/beans to 2 or more servings per week. Aim to increase intake of healthy fats, such as olive oil and avocados, and have a handful of nuts/seeds most days. Reduce red/processed meat consumption to 2 or fewer times per week.   Exercise: Recommended gradually increasing activity to 30-45 minutes most days of the week once cleared by referring cardiologist.   Medication Adherence: Patient has no issues with adherence at this time.   Smoking: This patient is a non-smoker.   Obesity/Overweight: The patient was advised about specific mechanisms such as reduced portions and increased activity for efforts toward weight loss.   Glucometabolic State: Based on HbA1c 6.3% today, patient is in the prediabetic range. We would recommend following up with his PCP for further testing to confirm his status and initiate treatment if necessary.   Sleep Apnea: The patient is at low risk for sleep apnea.   Psychological Stress: The patient appears to be coping with stressors well at this time.     Thank you for the opportunity to see this patient. Please feel free to contact Prevention if there are any questions, or if you feel that your patient would benefit from continued follow-up visits with the Program.    I am acting as a scribe on behalf of Dr. Anais Anderson,    Natalie James, [facility]  Cardiovascular Prevention     Anais Anderson MD  System Director, Cardiovascular Prevention

## 2019-12-02 NOTE — PROGRESS NOTE ADULT - SUBJECTIVE AND OBJECTIVE BOX
Interventional Cardiology PA SDA Discharge Note    Patient without complaints. Ambulated and voided without difficulties    Afebrile, VSS    Ext:  Right Groin: hematoma, no bruit, dressing; C/D/I      Pulses:    intact RAD/DP/PT?to baseline     A/P:  49 year old M former social smoker with FHx SCD (Cousin-40s) and PMHx HTN, Hyperlipidemia, CAD s/p Cardiac Arrest treated with CPR, Defibrillation, and Amiodarone Drip with subsequent s/p 3V CABG (CINTHYA-LAD, LIMA-OM, Radial Graft to RPDA) 2/12/2018 at [facility] by Dr. Nichole , Enhanced External Counter Pulsation Treatment 9/2013-11/2013, Diverticulitis (with recent flare secondary to ASA treated with 1 week course of Cipro and Flagyl -last dose 11/19; patient has been off ASA x 2-3 weeks as a result) who presented to cardiologist c/o C/P at rest. Patient underwent Cardiac CTA and Cardiac MRI which were abnormal. In light of patient's risk factors, CCS Angina Class IV Symptoms and abnormal CTA and Cardiac MRI patient now presents for cardiac cath.  Pt is now s/p dx cardiac cath on 12/2/19:          1.	Stable for discharge as per attending Dr. Clement after bed rest, pt voids, groin stable and 30 minutes of ambulation.  2.	Follow-up with PMD/Cardiologist Dr. Monge in 1-2 weeks.  3.	Discharged forms signed and copies in chart. Interventional Cardiology PA SDA Discharge Note    Patient without complaints. Ambulated and voided without difficulties    Afebrile, VSS    Ext:  Right Groin: No hematoma, no bruit, dressing; C/D/I    Pulses: 2+ intact DP/PT to baseline     A/P:  49 year old M former social smoker with FHx SCD (Cousin-40s) and PMHx HTN, Hyperlipidemia, CAD s/p Cardiac Arrest treated with CPR, Defibrillation, and Amiodarone Drip with subsequent s/p 3V CABG (CINTHYA-LAD, LIMA-OM, Radial Graft to RPDA) 2/12/2018 at [facility] by Dr. Nichole , Enhanced External Counter Pulsation Treatment 9/2013-11/2013, Diverticulitis (with recent flare secondary to ASA treated with 1 week course of Cipro and Flagyl -last dose 11/19; patient has been off ASA x 2-3 weeks as a result) who presented to cardiologist c/o C/P at rest. Patient underwent Cardiac CTA and Cardiac MRI which were abnormal. In light of patient's risk factors, CCS Angina Class IV Symptoms and abnormal CTA and Cardiac MRI patient now presents for cardiac cath.  Pt is now s/p dx cardiac cath on 12/2/19: 3VCAD; pLCx 80%, OM1 100% fills via ..., OM2 90% fills via LIMA, pLAD 70-80% (FFR 0.90, negative) fills via CINTHYA, pRCA 80%, RPDA 100% fills via RAD, patent LIMA-OM, patent RAD-RPDA, patent but ectatic CINTHYA-LAD,  normal LV function, EF 60s, no AS, no MR.  Pt was advised to follow up and discuss restarting ASA with Dr. Monge.      1.	Stable for discharge as per attending Dr. Clement after bed rest, pt voids, groin stable and 30 minutes of ambulation.  2.	Follow-up with PMD/Cardiologist Dr. Monge in 1-2 weeks.  3.	Discharged forms signed and copies in chart. Interventional Cardiology PA SDA Discharge Note    Patient without complaints. Ambulated and voided without difficulties    Afebrile, VSS    Ext:  Right Groin: No hematoma, no bruit, dressing; C/D/I    Pulses: 2+ intact DP/PT to baseline     A/P:  49 year old M former social smoker with FHx SCD (Cousin-40s) and PMHx HTN, Hyperlipidemia, CAD s/p Cardiac Arrest treated with CPR, Defibrillation, and Amiodarone Drip with subsequent s/p 3V CABG (CINTHYA-LAD, LIMA-OM, Radial Graft to RPDA) 2/12/2018 at [facility] by Dr. Nichole , Enhanced External Counter Pulsation Treatment 9/2013-11/2013, Diverticulitis (with recent flare secondary to ASA treated with 1 week course of Cipro and Flagyl -last dose 11/19; patient has been off ASA x 2-3 weeks as a result) who presented to cardiologist c/o C/P at rest. Patient underwent Cardiac CTA and Cardiac MRI which were abnormal. In light of patient's risk factors, CCS Angina Class IV Symptoms and abnormal CTA and Cardiac MRI patient now presents for cardiac cath.  Pt is now s/p dx cardiac cath on 12/2/19: 3VCAD; pLCx 80%, OM1 100% fills via RCA, OM2 90% fills via LIMA, pLAD 70-80% (FFR 0.90, negative) fills via CINTHYA, pRCA 80%, RPDA 100% fills via RAD, patent LIMA-OM, patent RAD-RPDA, patent but ectatic CINTHYA-LAD,  normal LV function, EF 60s, no AS, no MR.  Pt was advised to follow up and discuss restarting ASA with Dr. Monge.      1.	Stable for discharge as per attending Dr. Clement after bed rest, pt voids, groin stable and 30 minutes of ambulation.  2.	Follow-up with PMD/Cardiologist Dr. Monge in 1-2 weeks.  3.	Discharged forms signed and copies in chart.

## 2021-06-18 NOTE — DISCHARGE NOTE ADULT - FUNCTIONAL STATUS DATE
Next office visit: 7/30/21  Last office visit: 5/25/21  Last refill: 5/18/21  Medication(s) Requested:   methylphenidate (Ritalin) 20 MG tablet  methylpheniDATE ER (CONCERTA) 36 MG CR tablet  Is the patient due for refill of this medication(s): Yes  PDMP review: Criteria met. Forwarded to Physician/CHINO for signature.             15-Feb-2018

## 2023-04-22 NOTE — PHYSICAL THERAPY INITIAL EVALUATION ADULT - MD/RN NOTIFIED
The patient is Stable - Low risk of patient condition declining or worsening    Shift Goals  Clinical Goals: wound care, abx  Patient Goals: comfort, rest  Family Goals: n/a    Progress made toward(s) clinical / shift goals:    Problem: Knowledge Deficit - Standard  Goal: Patient and family/care givers will demonstrate understanding of plan of care, disease process/condition, diagnostic tests and medications  Outcome: Progressing     Problem: Hemodynamics  Goal: Patient's hemodynamics, fluid balance and neurologic status will be stable or improve  Outcome: Progressing     Problem: Respiratory  Goal: Patient will achieve/maintain optimum respiratory ventilation and gas exchange  Outcome: Progressing     Problem: Fall Risk  Goal: Patient will remain free from falls  Outcome: Progressing     Problem: Skin Integrity  Goal: Skin integrity is maintained or improved  Outcome: Progressing     Problem: Pain - Standard  Goal: Alleviation of pain or a reduction in pain to the patient’s comfort goal  Outcome: Progressing              yes

## 2023-09-20 NOTE — PROGRESS NOTE ADULT - PROBLEM SELECTOR PROBLEM 6
"Subjective:       Patient ID: Delores Richter is a 29 y.o. female.    Vitals:  height is 5' 3" (1.6 m) and weight is 107.5 kg (237 lb). Her temperature is 98.9 °F (37.2 °C). Her blood pressure is 131/84 and her pulse is 100. Her oxygen saturation is 98%.     Chief Complaint: Sinus Problem     + covid 11 days ago, daughters x2 positive today.  Patient states she normally gets sinus issues this time of year.    Sinus Problem  This is a new problem. The current episode started in the past 7 days (1 week ). The problem has been gradually worsening since onset. There has been no fever. She is experiencing no pain. Associated symptoms include congestion, coughing (Nonproductive, mild), ear pain and sinus pressure. Pertinent negatives include no chills, diaphoresis, headaches, hoarse voice, neck pain, shortness of breath, sneezing, sore throat or swollen glands. Past treatments include nothing.       Constitution: Negative for activity change, appetite change, chills, sweating, fatigue, fever, unexpected weight change, generalized weakness and international travel in last 60 days.        Close exposure to COVID    Loss of smell x 5 days ago   HENT: Positive for ear pain, congestion, postnasal drip and sinus pressure. Negative for tinnitus, hearing loss, dental problem, sinus pain, sore throat, trouble swallowing and voice change.    Neck: Negative for neck pain, neck stiffness and painful lymph nodes.   Cardiovascular: Negative for chest pain and sob on exertion.   Eyes: Positive for eye redness. Negative for eye discharge and eye itching.   Respiratory: Positive for cough (Nonproductive, mild). Negative for chest tightness, sputum production, bloody sputum, COPD, shortness of breath, stridor, wheezing and asthma.    Gastrointestinal: Negative for abdominal pain, nausea, vomiting and diarrhea.   Genitourinary: Negative for dysuria, frequency, urgency, urine decreased, flank pain, hematuria and history of kidney "
stones.   Musculoskeletal: Negative for joint pain, joint swelling and muscle ache.   Skin: Negative for pale, rash and erythema.   Allergic/Immunologic: Positive for immunizations up-to-date. Negative for seasonal allergies, asthma, sneezing and flu shot.   Neurological: Negative for light-headedness and headaches.   Hematologic/Lymphatic: Negative for swollen lymph nodes and easy bruising/bleeding. Does not bruise/bleed easily.       Objective:      Physical Exam   Constitutional: She is oriented to person, place, and time. She appears well-developed. She is cooperative.  Non-toxic appearance. She does not appear ill. No distress. well-groomed  HENT:   Head: Normocephalic and atraumatic.   Ears:   Right Ear: Hearing, external ear and ear canal normal. No mastoid tenderness. Tympanic membrane is not erythematous and not bulging. A middle ear effusion is present.   Left Ear: Hearing, external ear and ear canal normal. No mastoid tenderness. Tympanic membrane is erythematous and bulging. A middle ear effusion is present.   Nose: Mucosal edema and purulent discharge present. No rhinorrhea or nasal deformity. No epistaxis. Right sinus exhibits frontal sinus tenderness. Right sinus exhibits no maxillary sinus tenderness. Left sinus exhibits frontal sinus tenderness. Left sinus exhibits no maxillary sinus tenderness.   Mouth/Throat: Uvula is midline and mucous membranes are normal. Mucous membranes are not pale. No trismus in the jaw. Normal dentition. No uvula swelling. Posterior oropharyngeal erythema present. No oropharyngeal exudate, posterior oropharyngeal edema, tonsillar abscesses or cobblestoning. Tonsils are 0 on the right. Tonsils are 0 on the left. No tonsillar exudate.   Airway patent.  Normal phonation.  Symmetrical soft palate elevation.  No trismus or difficulty tolerating oral secretions.      Comments: Airway patent.  Normal phonation.  Symmetrical soft palate elevation.  No trismus or difficulty 
tolerating oral secretions.  Eyes: Conjunctivae and lids are normal. Right eye exhibits no discharge. Left eye exhibits no discharge. No scleral icterus.   Neck: Trachea normal, normal range of motion, full passive range of motion without pain and phonation normal. Neck supple. No neck rigidity. No edema and no erythema present.   Cardiovascular: Normal rate, regular rhythm, normal heart sounds and normal pulses.   No murmur heard.  Pulmonary/Chest: Effort normal and breath sounds normal. No respiratory distress. She has no decreased breath sounds. She has no wheezes. She has no rhonchi. She has no rales.    Comments: Respiratory rate 16, normal room air saturations.  No notable coughing at present.  Patient speaking full sentences without difficulty.  No evidence SOB/NAGY.    Abdominal: Soft. Normal appearance and bowel sounds are normal. She exhibits no distension. There is no abdominal tenderness. There is no guarding.   Musculoskeletal: Normal range of motion.         General: No deformity.      Right lower leg: No edema.      Left lower leg: No edema.   Neurological: She is alert and oriented to person, place, and time. She exhibits normal muscle tone. Coordination and gait normal.   Skin: Skin is warm, dry, intact, not diaphoretic, not pale and no rash. bruising and erythemajaundicePsychiatric: Her speech is normal and behavior is normal. Mood, judgment and thought content normal.   Nursing note and vitals reviewed.        Assessment:       1. Acute suppurative otitis media of left ear without spontaneous rupture of tympanic membrane, recurrence not specified    2. Close exposure to COVID-19 virus    3. COVID-19 virus detected    4. Acute frontal sinusitis, recurrence not specified        Plan:     Alert, nontoxic and in NAD. Afebrile.  Patient with no evidence of respiratory distress.  Patient with OM, sinusitis.  Will test for COVID, + covid in clinic, reviewed with pt.  Advised on COVID testing, signs and 
symptoms of COVID, symptomatic management at home, signs and symptoms to seek emergency care, CDC quarantine guidelines for COVID.  Patient verbalized understanding and agreement treatment plan.      Office Visit on 10/04/2020   Component Date Value Ref Range Status    POC Rapid COVID 10/04/2020 Positive* Negative Final     Acceptable 10/04/2020 Yes   Final         Acute suppurative otitis media of left ear without spontaneous rupture of tympanic membrane, recurrence not specified  -     amoxicillin (AMOXIL) 875 MG tablet; Take 1 tablet (875 mg total) by mouth every 12 (twelve) hours. for 10 days  Dispense: 20 tablet; Refill: 0  -     azelastine (ASTELIN) 137 mcg (0.1 %) nasal spray; 1 spray (137 mcg total) by Nasal route 2 (two) times daily.  Dispense: 30 mL; Refill: 0    Close exposure to COVID-19 virus  -     POCT COVID-19 Rapid Screening    COVID-19 virus detected  -     COVID-19 Home Symptom Monitoring  - Duration (days): 14    Acute frontal sinusitis, recurrence not specified  -     amoxicillin (AMOXIL) 875 MG tablet; Take 1 tablet (875 mg total) by mouth every 12 (twelve) hours. for 10 days  Dispense: 20 tablet; Refill: 0  -     azelastine (ASTELIN) 137 mcg (0.1 %) nasal spray; 1 spray (137 mcg total) by Nasal route 2 (two) times daily.  Dispense: 30 mL; Refill: 0         Patient Instructions   COVID 19-CORONA TREATMENT AS AN OUTPATIENT  I.  1) Motrin/advil/ibuprofen- Take Two Tablets(200 mg) three Times a Day for 5 to 7 Days if over 90 pounds.If under 90 pounds take one motrin 200 mg once or an equivalent Childrens liquid dose for the patients weight and age.  2) Mucinex D 1/2 Tablet twice a day for 5 to 7 Days.If under 80 pounds take a 1/4 of a tablet or get the Childrens Liquid dose for the patients age and/or weight.  3) Drink Hot Liquids(Coffee if an adult,WATER,Tea,Hot Chocolate,or Soup) that you put in a Mug place in Microwave for 2.5 to 3 minutes CHANGE THE CUP THAT WAS USED IN THE 
MICROWAVE SO AS NOT TO BURN YOUR MOUTH,then sniff the steam from the cup and sip the heated liquid TEN TO TWELVE TIMES A DAY for 5 to 7 Days.  4) These 3 things will help the antibiotics and other medications work faster and will speed your recovery!    II.  If COVID19-CORONA  is presumptive diagnosis then there are no medications that treat this virus to date.In order to lessen symptoms or  to turn off the INFLAMMATORY PATHWAYS you need to take :  1)Vitamin C 500 to 1000 mg by mouth once a day.  2)B1 (Thiamine)(or a B Complex tablet-The B complex vitamin includes all the B vitamins) vitamin one tablet once a day .  3)Vitamin D 2000 to 5000 IU once a day   4)If older than 25 take Asprin 325 (or two 81 mg Asprin ) once a day for 1 to 2 months.  5)Melatonin 3 to 6 mg by mouth at 6 to 7 2 hours before bedtime .(it will make you sleepy so do not drive after taking this medication)  6)Take zinc 100 mg by mouth every day .  7)Get a Pulse Oximeter to check the oxygen in your blood and pulse,then Check your oxygen and  pulse 4 to 6 times a day if it drops below 95% go to the Emergency Room .If you see a pulse above 110 and your not stressed or have done heavy work/activitity and your oxygen is above 95% then you need to drink more fluids if there are no other symptoms.  · 8)Probiotics to replace good bacteria lost with diarrhea.   ·   Instructions for Patients with Confirmed or Suspected COVID-19    If you are awaiting your test result, you will either be called or it will be released to the patient portal.  If you have any questions about your test, please visit [email or web address] or call our COVID-19 information line at [phone].      Instructions for non-hospitalized or discharged patients with confirmed or suspected COVID-19:      Stay home except to get medical care.   Separate yourself from other people and animals in your home.   Call ahead before visiting your doctor.   Wear a face mask.   Cover your 
coughs and sneezes.   Clean your hands often.   Avoid sharing personal household items.   Clean all high-touch surfaces every day.   Monitor your symptoms. Seek prompt medical attention if your illness is worsening (e.g., difficulty breathing). Before seeking care, call your healthcare provider.   If you have a medical emergency and must call 911, notify the dispatcher that you have or are being evaluated for COVID-19. If possible, put on a face mask before emergency medical services arrive.   Use the following symptom-based strategy to return to normal activity following a suspected or confirmed case of COVID-19. Continue isolation until:   At least 3 days (72 hours) have passed since recovery defined as resolution of fever without the use of fever-reducing medications and improvement in respiratory symptoms (e.g. cough, shortness of breath), and   At least 10 days have passed since the first positive test.       As one of the next steps, you will receive a call or text from the [place] Department of Health ([facility]) COVID-19 Tracing Team. See the contact information below so you know not to ignore the health departments call. It is important that you contact them back immediately so they can help.     Contact Tracer Number:  [phone]  Caller ID for most carriers: [facility]    What is contact tracing?  Contact tracing is a process that helps identify everyone who has been in close contact with an infected person. Contact tracers let those people know they may have been exposed and guide them on next steps. Confidentiality is important for everyone; no one will be told who may have exposed them to the virus.  Your involvement is important. The more we know about where and how this virus is spreading, the better chance we have at stopping it from spreading further.  What does exposure mean?  Exposure means you have been within 6 feet for more than 15 minutes with a person who has or had COVID-19.  What 
kind of questions do the contact tracers ask?  A contact tracer will confirm your basic contact information including name, address, phone number, and next of kin, as well as asking about any symptoms you may have had. Theyll also ask you how you think you may have gotten sick, such as places where you may have been exposed to the virus, and people you were with. Those names will never be shared with anyone outside of that call, and will only be used to help trace and stop the spread of the virus.   I have privacy concerns. How will the state use my information?  Your privacy about your health is important. All calls are completed using call centers that use the appropriate health privacy protection measures (HIPAA compliance), meaning that your patient information is safe. No one will ever ask you any questions related to immigration status. Your health comes first.   Do I have to participate?  You do not have to participate, but we strongly encourage you to. Contact tracing can help us catch and control new outbreaks as theyre developing to keep your friends and family safe.   What if I dont hear from anyone?  If you dont receive a call within 24 hours, you can call the number above right away to inquire about your status. That line is open from 8:00 am - 8:00 p.m., 7 days a week.  Contact tracing saves lives! Together, we have the power to beat this virus and keep our loved ones and neighbors safe.       Instructions for household members, intimate partners and caregivers in a non-healthcare setting of a patient with confirmed or suspected COVID-19:        Close contacts should monitor their health and call their healthcare provider right away if they develop symptoms suggestive of COVID-19 (e.g., fever, cough, shortness of breath).   Stay home except to get medical care. Separate yourself from other people and animals in the home.  Monitor the patients symptoms. If the patient is getting sicker, call his or 
her healthcare provider. If the patient has a medical emergency and you need to call 911, notify the dispatch personnel that the patient has or is being evaluated for COVID-19.   Wear a facemask when around other people such as sharing a room or vehicle and before entering a healthcare provider's office.  Cover coughs and sneezes with a tissue. Throw used tissues in a lined trash can immediately and wash hands.  Clean hands often with soap and water for at least 20 seconds or with an alcohol-based hand , rubbing hands together until they feel dry. Avoid touching your eyes, nose, and mouth with unwashed hands.  Clean all high-touch; surfaces every day, including counters, tabletops, doorknobs, bathroom fixtures, toilets, phones, keyboards, tablets, bedside tables, etc. Use a household cleaning spray or wipe according to label instructions.  Avoid sharing personal household items such as dishes, drinking glasses, cups, towels, bedding, etc. After these items are used, they should be washed thoroughly with soap and water.  Continue isolation until:  At least 3 days (72 hours) have passed since recovery defined as resolution of fever without the use of fever-reducing medications and improvement in respiratory symptoms (e.g. cough, shortness of breath), and   At least 10 days have passed since the patients first positive test.    https://www.cdc.gov/coronavirus/2019-ncov/your-health/index.htm  ·     1.  Take all antibiotics as prescribed.  It is imperative that once you start them, you take them to completion unless otherwise directed.  You should not have left over antibiotics.     2.  For patients above 6 months of age who are not allergic to and are not on anticoagulants, you can alternate Tylenol and Motrin every 4-6 hours for fever above 100.4F and/or pain.  For patients less than 6 months of age, allergic to or intolerant to NSAIDS, have gastritis, gastric ulcers, or history of GI bleeds, are pregnant, 
or are on anticoagulant therapy, you can take Tylenol every 4 hours as needed for fever above 100.4F and/or pain.     3.  Rest and keep yourself well hydrated.  Drink hot liquids (coffee, water, tea, hot chocolate, or soup) 10-12 times a day for 5-7 days.  Put liquid in a mug and place in microwave for 2.5 - 3 minutes. Pour hot liquid into another mug not used to microwave the liquid (to avoid burning your mouth) then sniff the steam from the cup and sip the heated liquid.    4.  You can use these over the counter medications/remedies to help with your symptoms:     Runny Nose:  Use an antihistamine such as Claritin, Zyrtec or Allegra to help dry you out.     Congestion:  Coricidin HBP is okay to use if you have high blood pressure.     Use mucinex (guaifenisin) up to 2400mg/day to break up/loosen any mucous. MucinexDM has a cough suppressant that can be used for cough and at night to stop the tickle in the back of your throat.    Use Nasal Saline to mechanically move any post nasal drip from your eustachian tubes or from the back of your throat.    Use Flonase or astelin 1-2 sprays/nostril per day. It is a local acting steroid nasal spray.  If you develop a bloody nose, stop using the medication immediately.    Sore throat:  Use warm, salt water gargles to ease your throat pain- 1/2 tsp salt to 1 cup warm water, gargle as desired.  Chloraseptic sprays and throat lozenges will also help to ease throat pain.     Sometimes Nyquil at night is beneficial to help you get some rest; however, it is sedating and does contain an antihistamine and Tylenol.  Make sure not to double up on these medications. Additionally you should not take this if you have high blood pressure.       These things will help you to feel better and will speed your recovery.  If your condition fails to improve in a timely manner, you should receive another evaluation by your Primary Care Provider/Pediatrician to discuss your concerns or return to 
urgent care for a recheck.  If your condition worsens at any time, you should report immediately to your nearest Emergency Department for further evaluation. **You must understand that you have received Urgent Care treatment only and that you may be released before all of your medical problems are known or treated. You, the patient, are responsible to arrange for follow-up care as instructed.       Middle Ear Infection (Adult)  You have an infection of the middle ear, the space behind the eardrum. This is also called acute otitis media (AOM). Sometimes it is caused by the common cold. This is because congestion can block the internal passage (eustachian tube) that drains fluid from the middle ear. When the middle ear fills with fluid, bacteria can grow there and cause an infection. Oral antibiotics are used to treat this illness, not ear drops. Symptoms usually start to improve within 1 to 2 days of treatment.    Home care  The following are general care guidelines:  · Finish all of the antibiotic medicine given, even though you may feel better after the first few days.  · You may use over-the-counter medicine, such as acetaminophen or ibuprofen, to control pain and fever, unless something else was prescribed. If you have chronic liver or kidney disease or have ever had a stomach ulcer or gastrointestinal bleeding, talk with your healthcare provider before using these medicines. Do not give aspirin to anyone under 18 years of age who has a fever. It may cause severe illness or death.  Follow-up care  Follow up with your healthcare provider, or as advised, in 2 weeks if all symptoms have not gotten better, or if hearing doesn't go back to normal within 1 month.  When to seek medical advice  Call your healthcare provider right away if any of these occur:  · Ear pain gets worse or does not improve after 3 days of treatment  · Unusual drowsiness or confusion  · Neck pain, stiff neck, or headache  · Fluid or blood 
draining from the ear canal  · Fever of 100.4°F (38°C) or as advised   · Seizure  Date Last Reviewed: 6/1/2016  © 2313-1419 [employer]. [address] [place] [address], [place] [address]. All rights reserved. This information is not intended as a substitute for professional medical care. Always follow your healthcare professional's instructions.            · Follow up with your primary care in 2-5 days if symptoms have not improved, or you may return here.  · If you were referred to a specialist, please follow up with that specialty.  · If you were prescribed antibiotics, please take them to completion.  · If you were prescribed a narcotic or any medication with sedative effects, do not drive or operate heavy equipment or machinery while taking these medications.  · You must understand that you have received treatment at an Urgent Care facility only, and that you may be released before all of your medical problems are known or treated. Urgent Care facilities are not equipped to handle life threatening emergencies. It is recommended that you go to an Emergency Department for further evaluation of worsening or concerning symptoms, or possibly life threatening conditions as discussed.                                        If you  smoke, please stop smoking            
Do Not Resuscitate (DNR)
Nutrition, metabolism, and development symptoms

## 2024-02-14 NOTE — PATIENT PROFILE ADULT. - MEDICATIONS BROUGHT TO HOSPITAL, PROFILE
Patient ID: Tank Bernardo is a 26 y.o. male who presents for the evaluation of left-sided hearing loss, otorrhea, aural fullness, and ear itching.    PROVIDER IMPRESSIONS:  DIAGNOSES/PROBLEMS:  -Fungal otitis externa, left ear  -Bilateral cerumen impaction  -Conductive hearing loss in left ear, unrestricted hearing in right ear    ASSESSMENT:   Tank Bernardo is a pleasant 26 y.o. male who presents with symptoms of left hearing loss, left aural fullness, left otorrhea and mild left ear itching for the past 5 months. Based on the clinical information provided, symptoms and clinical exam findings are consistent with bilateral cerumen impaction and left fungal otitis externa (OE). Using appropriate instrumentation, impacted cerumen was removed from bilateral EACs. Impacted white/rubbery/moist fungal debris was also removed from left EAC, which appeared moist/inflamed/erythematous after cleaning, with application of 1 puff of boric acid powder to the left ear in clinic today after cleaning for antifungal treatment. Reassurance provided to patient that otologic exam today revealed no evidence of acute infection/inflammation in the right external auditory canal (EAC) and that tympanic membrane (TM) appears with no evidence of infection, effusion, retraction or perforation bilaterally.  Audiogram reviewed in detail with the patient, which revealed left conductive hearing loss and type B tympanogram on the left which is likely secondary to excessive cerumen and fungal debris in the left EAC during audiogram.     PLAN:  I recommended Clotrimazole 1% solution with 4-5 drops into the left ear canal twice daily for 21 days. Patient instructed on proper application of medication into the left ear and provided plastic pipette for application. I informed patient to initiate otic drops tomorrow as boric acid powder was applied in clinic today. Prescription e-submitted to pharmacy.   I counseled patient on safe interventions for cerumen  management at home. Patient may wash the external ear with a cloth. I reinforced education to the patient that they should avoid using cotton tipped applicators, tissues, paper, or any rigid object in the ear canal. I explained to the patient that doing so may cause wax to be pushed back into the ear canal and stuck and also risks injury to the ear canal and ear drum. Patient advised to avoid using o.t.c. ear endoscopic camera in the ear canals and to dispose/clean any ear plug or ear buds that he has used previously.  Patient counseled on dry ear precautions, detailed information on discussion provided in the patient instructions section.  Follow-up: Patient may schedule for follow-up in 2-3 weeks without audiogram for repeat aural toilet of left EAC and to evaluate treatment outcomes for left fungal OE. May consider instillation of lotrisone cream at follow-up into the left EAC for ongoing infection. If infection is resolved, will consider repeat audiogram. They may follow-up sooner, if needed. Patient is agreeable to this plan, all questions were answered to patient's satisfaction.     Subjective   HPI: Tank Bernardo is a 26 y.o. male who presents for the evaluation of left hearing loss and left aural fullness. The patient states that symptom onset began approximately 5 months ago and gradually progressed over time. Patient seen at urgent care in September 2023 for left ear pain, left ear fullness and left muffled hearing sensation and was prescribed p.o. cefdinir for an ear infection that provided mild symptom benefit of ear pain after completion. A few weeks later he received second course of p.o. cefdinir for ongoing symptoms which resolved left ear pain entirely after completion but did not improve left-sided hearing difficulty and ear fullness sensation. Describes left hearing loss as a muffled sound that he compares to hearing underwater and that changes with external ear movement. States that hearing in the  left ear is currently around 50% of baseline hearing function. When asked about the presence of right hearing loss, ear pain, right ear fullness/pressure, tinnitus, ear itching, ear drainage, autophony, dizziness or vertigo, he admits to slight left ear itching and left ear drainage that appears white/yellow/crusted on his pillow when he lays on his left side. When asked about pertinent otologic history, the patient denies a history of recurrent ear infections, denies history of ear surgery, denies history of PE tube insertion. 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 The patient does not insert Q-tips in the ear canals but reports insertion of an o.t.c. ear endoscopic camera with a plastic applicator tool into the ear canals that he received in December 2023. He also states that he will occasionally instill hydrogen peroxide into the ear canals for cleaning. The patient denies history of wearing hearing aid devices. The patient does not endorse a family history of hearing loss. Mentions a history of allergies to amoxicillin, stating that he has had a reaction in childhood of rash, nausea, and vomiting.     PATIENT HISTORY:  No past medical history on file.   No past surgical history on file.   Not on File   No current outpatient medications on file.   Tobacco Use: Not on file      Alcohol Use: Not on file      Social History     Substance and Sexual Activity   Drug Use Not on file        Review of Systems   All other systems negative.     Objective   There were no vitals taken for this visit.     PHYSICAL EXAM:  General appearance: Appears well, well-nourished, well groomed. No acute distress.   Constitutional: No fever, chills, weight loss or weight gain.  Communication: Normal communication  Psychiatric: Oriented  to person, place and time. Normal mood and affect.  Neurologic: Cranial nerves II-XII grossly intact and symmetric bilaterally.  Cardiovascular: Examination of peripheral vascular system shows no clubbing or cyanosis.  Respiratory: No respiratory distress increased work of breathing. Inspection of the chest with symmetric chest expansion and normal respiratory effort.  Skin: No head and neck rashes.  Head: Normocephalic. Atraumatic with no masses, lesions or scarring.  Face: Normal symmetry. No scars or deformities.  Eyes: Conjunctiva not edematous or erythematous. PERRLA  Neck: Supple and symmetric, trachea midline. Lymph nodes with no adenopathy.  Head: Normocephalic. Atraumatic with no masses, lesions or scarring.  Eyes: PERRL, EOMI, Conjunctiva is clear. No nystagmus.  Nose: External inspection of nose: No nasal lesions, lacerations or scars. No tenderness on frontal or maxillary sinus palpation.  Throat:  Floor of mouth is clear, no masses.  Tongue appears normal, no lesions or masses. Gums, gingiva, buccal mucosa appear pink and moist, no lesions. Teeth are in intact.  No obvious dental infections.  Peritonsillar regions appear symmetric without swelling.  Hard and soft palate appear normal, no obvious cleft. Uvula is midline.  Oropharynx: No lesions. Retropharyngeal wall is flat.  No postnasal drip.  Salivary Glands: Symmetric bilaterally.  No palpable masses.  No evidence of acute infection or salivary stones.  TMJ: Normal, no trismus.  Right Ear: External inspection of ear with no deformity, scars, or masses. Mastoid is nontender. External auditory canal is partially impacted with cerumen. Unable to visualize TM.   Left Ear: External inspection of ear with no deformity, scars, or masses. Mastoid is nontender. External auditory canal is completely impacted with cerumen and moist/white/rubbery debris. Unable to visualize TM.     RESULTS:  I personally reviewed the patient's audiogram today from 2/14/2024,  which showed: Normal hearing across all frequencies in right ear. Left ear with normal hearing through 1000 Hz, sloping to mild conductive hearing loss through 3000 Hz, rising to normal hearing at 4000 Hz, sloping to mild hearing loss above. Normal tympanogram in the right ear, type B tympanogram in the left ear. Excellent word discrimination bilaterally. Acoustic reflexes present right ipsilateral, and absent left ipsilateral.    Procedures   EAR CLEANING PROCEDURE NOTE:  Indication: Cerumen and infectious debris impaction  Location: bilateral ear canals  Procedure Note: The procedure was performed by the provider.  Visualization Instrument: A microscope with a #5 speculum was placed in the ear canals to visualize the ear canal debris.  Ear Cleaning Instrument and Outcome: Using the 5/7 suction, a large amount of soft/yellow cerumen was removed from the right EAC. Using a different speculum and new suction tools, a copious amount of cerumen and infectious debris that appeared white/rubbery/moist  was removed from the left EAC. After left EAC cleaning, 1 puff of boric acid powder was applied to lightly coat the left EAC.  Post-Procedure/Microscopic Otologic Exam:  Right Ear--TM is intact with no sign of infection, effusion, or retraction.  No perforation seen. EAC is clear.   Left Ear-- TM is intact with no sign of infection, effusion, or retraction.  No perforation seen. EAC is clear and appears inflamed/erythematous.   Patient Status: The patient tolerated the procedure well.  Complications: There were no complications.     Andreina Sun, APRN-CNP    no

## 2024-04-17 NOTE — DISCHARGE NOTE ADULT - NSFTFHOMEOTHERFT_GEN_ALL_CORE
[s/p OPCAB

## 2025-01-15 NOTE — PATIENT PROFILE ADULT. - CURRENT SWALLOWING
Reason for call:   [x] Refill   [] Prior Auth  [] Other:     Office:   [] PCP/Provider -   [x] Specialty/Provider - pulm/Joyce    Medication: Albuterol HFA    Dose/Frequency: Inhale 2 puffs q6h    Quantity: 18g    Pharmacy: [id number]  [place], [place] - [address] [phone]     Medication: Trelegy Elipta    Dose/Frequency: 1 puff daily    Quantity: 180    Pharmacy: [facility] - [place] [phone]     Does the patient have enough for 3 days?   [] Yes   [x] No - Send as HP to POD     (0) swallows foods and liquids w/o difficulty